# Patient Record
Sex: MALE | Race: OTHER | HISPANIC OR LATINO | URBAN - METROPOLITAN AREA
[De-identification: names, ages, dates, MRNs, and addresses within clinical notes are randomized per-mention and may not be internally consistent; named-entity substitution may affect disease eponyms.]

---

## 2019-01-24 ENCOUNTER — INPATIENT (INPATIENT)
Facility: HOSPITAL | Age: 29
LOS: 6 days | Discharge: HOME | DRG: 959 | End: 2019-01-31
Attending: SURGERY | Admitting: SURGERY
Payer: COMMERCIAL

## 2019-01-24 VITALS
HEIGHT: 73.23 IN | SYSTOLIC BLOOD PRESSURE: 116 MMHG | TEMPERATURE: 98 F | WEIGHT: 204.59 LBS | HEART RATE: 94 BPM | OXYGEN SATURATION: 99 % | RESPIRATION RATE: 11 BRPM | DIASTOLIC BLOOD PRESSURE: 54 MMHG

## 2019-01-24 DIAGNOSIS — S32.9XXA FRACTURE OF UNSPECIFIED PARTS OF LUMBOSACRAL SPINE AND PELVIS, INITIAL ENCOUNTER FOR CLOSED FRACTURE: ICD-10-CM

## 2019-01-24 LAB
ABO RH CONFIRMATION: SIGNIFICANT CHANGE UP
ALBUMIN SERPL ELPH-MCNC: 4.2 G/DL — SIGNIFICANT CHANGE UP (ref 3.3–5.2)
ALP SERPL-CCNC: 76 U/L — SIGNIFICANT CHANGE UP (ref 40–120)
ALT FLD-CCNC: 33 U/L — SIGNIFICANT CHANGE UP
ANION GAP SERPL CALC-SCNC: 10 MMOL/L — SIGNIFICANT CHANGE UP (ref 5–17)
ANION GAP SERPL CALC-SCNC: 13 MMOL/L — SIGNIFICANT CHANGE UP (ref 5–17)
APTT BLD: 24.4 SEC — LOW (ref 27.5–36.3)
APTT BLD: 30 SEC — SIGNIFICANT CHANGE UP (ref 27.5–36.3)
AST SERPL-CCNC: 32 U/L — SIGNIFICANT CHANGE UP
BASOPHILS # BLD AUTO: 0 K/UL — SIGNIFICANT CHANGE UP (ref 0–0.2)
BASOPHILS NFR BLD AUTO: 0.3 % — SIGNIFICANT CHANGE UP (ref 0–2)
BILIRUB SERPL-MCNC: 0.4 MG/DL — SIGNIFICANT CHANGE UP (ref 0.4–2)
BLD GP AB SCN SERPL QL: SIGNIFICANT CHANGE UP
BUN SERPL-MCNC: 14 MG/DL — SIGNIFICANT CHANGE UP (ref 8–20)
BUN SERPL-MCNC: 15 MG/DL — SIGNIFICANT CHANGE UP (ref 8–20)
CALCIUM SERPL-MCNC: 8.1 MG/DL — LOW (ref 8.6–10.2)
CALCIUM SERPL-MCNC: 8.8 MG/DL — SIGNIFICANT CHANGE UP (ref 8.6–10.2)
CHLORIDE SERPL-SCNC: 103 MMOL/L — SIGNIFICANT CHANGE UP (ref 98–107)
CHLORIDE SERPL-SCNC: 104 MMOL/L — SIGNIFICANT CHANGE UP (ref 98–107)
CO2 SERPL-SCNC: 25 MMOL/L — SIGNIFICANT CHANGE UP (ref 22–29)
CO2 SERPL-SCNC: 25 MMOL/L — SIGNIFICANT CHANGE UP (ref 22–29)
CREAT SERPL-MCNC: 0.89 MG/DL — SIGNIFICANT CHANGE UP (ref 0.5–1.3)
CREAT SERPL-MCNC: 0.92 MG/DL — SIGNIFICANT CHANGE UP (ref 0.5–1.3)
EOSINOPHIL # BLD AUTO: 0.1 K/UL — SIGNIFICANT CHANGE UP (ref 0–0.5)
EOSINOPHIL NFR BLD AUTO: 1.5 % — SIGNIFICANT CHANGE UP (ref 0–5)
ETHANOL SERPL-MCNC: <10 MG/DL — SIGNIFICANT CHANGE UP
GLUCOSE SERPL-MCNC: 115 MG/DL — SIGNIFICANT CHANGE UP (ref 70–115)
GLUCOSE SERPL-MCNC: 142 MG/DL — HIGH (ref 70–115)
HCT VFR BLD CALC: 33 % — LOW (ref 42–52)
HCT VFR BLD CALC: 40.7 % — LOW (ref 42–52)
HGB BLD-MCNC: 11.2 G/DL — LOW (ref 14–18)
HGB BLD-MCNC: 13.8 G/DL — LOW (ref 14–18)
INR BLD: 1.07 RATIO — SIGNIFICANT CHANGE UP (ref 0.88–1.16)
INR BLD: 1.17 RATIO — HIGH (ref 0.88–1.16)
LACTATE BLDV-MCNC: 2 MMOL/L — SIGNIFICANT CHANGE UP (ref 0.5–2)
LACTATE SERPL-SCNC: 1.6 MMOL/L — SIGNIFICANT CHANGE UP (ref 0.5–2)
LYMPHOCYTES # BLD AUTO: 3.7 K/UL — SIGNIFICANT CHANGE UP (ref 1–4.8)
LYMPHOCYTES # BLD AUTO: 42.8 % — SIGNIFICANT CHANGE UP (ref 20–55)
MAGNESIUM SERPL-MCNC: 1.7 MG/DL — SIGNIFICANT CHANGE UP (ref 1.6–2.6)
MCHC RBC-ENTMCNC: 28.9 PG — SIGNIFICANT CHANGE UP (ref 27–31)
MCHC RBC-ENTMCNC: 29.1 PG — SIGNIFICANT CHANGE UP (ref 27–31)
MCHC RBC-ENTMCNC: 33.9 G/DL — SIGNIFICANT CHANGE UP (ref 32–36)
MCHC RBC-ENTMCNC: 33.9 G/DL — SIGNIFICANT CHANGE UP (ref 32–36)
MCV RBC AUTO: 85.3 FL — SIGNIFICANT CHANGE UP (ref 80–94)
MCV RBC AUTO: 85.7 FL — SIGNIFICANT CHANGE UP (ref 80–94)
MONOCYTES # BLD AUTO: 0.3 K/UL — SIGNIFICANT CHANGE UP (ref 0–0.8)
MONOCYTES NFR BLD AUTO: 4 % — SIGNIFICANT CHANGE UP (ref 3–10)
NEUTROPHILS # BLD AUTO: 4.3 K/UL — SIGNIFICANT CHANGE UP (ref 1.8–8)
NEUTROPHILS NFR BLD AUTO: 49.8 % — SIGNIFICANT CHANGE UP (ref 37–73)
PLATELET # BLD AUTO: 147 K/UL — LOW (ref 150–400)
PLATELET # BLD AUTO: 172 K/UL — SIGNIFICANT CHANGE UP (ref 150–400)
POTASSIUM SERPL-MCNC: 3.6 MMOL/L — SIGNIFICANT CHANGE UP (ref 3.5–5.3)
POTASSIUM SERPL-MCNC: 4.2 MMOL/L — SIGNIFICANT CHANGE UP (ref 3.5–5.3)
POTASSIUM SERPL-SCNC: 3.6 MMOL/L — SIGNIFICANT CHANGE UP (ref 3.5–5.3)
POTASSIUM SERPL-SCNC: 4.2 MMOL/L — SIGNIFICANT CHANGE UP (ref 3.5–5.3)
PROT SERPL-MCNC: 7.3 G/DL — SIGNIFICANT CHANGE UP (ref 6.6–8.7)
PROTHROM AB SERPL-ACNC: 12.3 SEC — SIGNIFICANT CHANGE UP (ref 10–12.9)
PROTHROM AB SERPL-ACNC: 13.5 SEC — HIGH (ref 10–12.9)
RBC # BLD: 3.87 M/UL — LOW (ref 4.6–6.2)
RBC # BLD: 4.75 M/UL — SIGNIFICANT CHANGE UP (ref 4.6–6.2)
RBC # FLD: 13.5 % — SIGNIFICANT CHANGE UP (ref 11–15.6)
RBC # FLD: 13.8 % — SIGNIFICANT CHANGE UP (ref 11–15.6)
SODIUM SERPL-SCNC: 139 MMOL/L — SIGNIFICANT CHANGE UP (ref 135–145)
SODIUM SERPL-SCNC: 141 MMOL/L — SIGNIFICANT CHANGE UP (ref 135–145)
TYPE + AB SCN PNL BLD: SIGNIFICANT CHANGE UP
WBC # BLD: 11.7 K/UL — HIGH (ref 4.8–10.8)
WBC # BLD: 8.6 K/UL — SIGNIFICANT CHANGE UP (ref 4.8–10.8)
WBC # FLD AUTO: 11.7 K/UL — HIGH (ref 4.8–10.8)
WBC # FLD AUTO: 8.6 K/UL — SIGNIFICANT CHANGE UP (ref 4.8–10.8)

## 2019-01-24 PROCEDURE — 71260 CT THORAX DX C+: CPT | Mod: 26

## 2019-01-24 PROCEDURE — 72125 CT NECK SPINE W/O DYE: CPT | Mod: 26

## 2019-01-24 PROCEDURE — 27197 CLSD TX PELVIC RING FX: CPT | Mod: RT

## 2019-01-24 PROCEDURE — 74177 CT ABD & PELVIS W/CONTRAST: CPT | Mod: 26

## 2019-01-24 PROCEDURE — 74018 RADEX ABDOMEN 1 VIEW: CPT | Mod: 26

## 2019-01-24 PROCEDURE — 71045 X-RAY EXAM CHEST 1 VIEW: CPT | Mod: 26

## 2019-01-24 PROCEDURE — 99291 CRITICAL CARE FIRST HOUR: CPT

## 2019-01-24 PROCEDURE — 99223 1ST HOSP IP/OBS HIGH 75: CPT | Mod: 25,57

## 2019-01-24 PROCEDURE — 70450 CT HEAD/BRAIN W/O DYE: CPT | Mod: 26

## 2019-01-24 PROCEDURE — 72131 CT LUMBAR SPINE W/O DYE: CPT | Mod: 26

## 2019-01-24 RX ORDER — KETOROLAC TROMETHAMINE 30 MG/ML
30 SYRINGE (ML) INJECTION ONCE
Qty: 0 | Refills: 0 | Status: DISCONTINUED | OUTPATIENT
Start: 2019-01-24 | End: 2019-01-24

## 2019-01-24 RX ORDER — SODIUM CHLORIDE 9 MG/ML
1000 INJECTION, SOLUTION INTRAVENOUS
Qty: 0 | Refills: 0 | Status: DISCONTINUED | OUTPATIENT
Start: 2019-01-24 | End: 2019-01-25

## 2019-01-24 RX ORDER — HYDROMORPHONE HYDROCHLORIDE 2 MG/ML
1 INJECTION INTRAMUSCULAR; INTRAVENOUS; SUBCUTANEOUS EVERY 4 HOURS
Qty: 0 | Refills: 0 | Status: DISCONTINUED | OUTPATIENT
Start: 2019-01-24 | End: 2019-01-29

## 2019-01-24 RX ORDER — ACETAMINOPHEN 500 MG
1000 TABLET ORAL ONCE
Qty: 0 | Refills: 0 | Status: COMPLETED | OUTPATIENT
Start: 2019-01-24 | End: 2019-01-25

## 2019-01-24 RX ORDER — HYDROMORPHONE HYDROCHLORIDE 2 MG/ML
2 INJECTION INTRAMUSCULAR; INTRAVENOUS; SUBCUTANEOUS EVERY 4 HOURS
Qty: 0 | Refills: 0 | Status: DISCONTINUED | OUTPATIENT
Start: 2019-01-24 | End: 2019-01-24

## 2019-01-24 RX ORDER — CHLORHEXIDINE GLUCONATE 213 G/1000ML
1 SOLUTION TOPICAL DAILY
Qty: 0 | Refills: 0 | Status: DISCONTINUED | OUTPATIENT
Start: 2019-01-24 | End: 2019-01-29

## 2019-01-24 RX ORDER — MAGNESIUM SULFATE 500 MG/ML
2 VIAL (ML) INJECTION ONCE
Qty: 0 | Refills: 0 | Status: COMPLETED | OUTPATIENT
Start: 2019-01-24 | End: 2019-01-25

## 2019-01-24 RX ORDER — FENTANYL CITRATE 50 UG/ML
50 INJECTION INTRAVENOUS ONCE
Qty: 0 | Refills: 0 | Status: DISCONTINUED | OUTPATIENT
Start: 2019-01-24 | End: 2019-01-24

## 2019-01-24 RX ORDER — HYDROMORPHONE HYDROCHLORIDE 2 MG/ML
0.5 INJECTION INTRAMUSCULAR; INTRAVENOUS; SUBCUTANEOUS EVERY 4 HOURS
Qty: 0 | Refills: 0 | Status: DISCONTINUED | OUTPATIENT
Start: 2019-01-24 | End: 2019-01-25

## 2019-01-24 RX ADMIN — HYDROMORPHONE HYDROCHLORIDE 2 MILLIGRAM(S): 2 INJECTION INTRAMUSCULAR; INTRAVENOUS; SUBCUTANEOUS at 15:55

## 2019-01-24 RX ADMIN — SODIUM CHLORIDE 125 MILLILITER(S): 9 INJECTION, SOLUTION INTRAVENOUS at 13:55

## 2019-01-24 RX ADMIN — Medication 30 MILLIGRAM(S): at 12:32

## 2019-01-24 RX ADMIN — HYDROMORPHONE HYDROCHLORIDE 0.5 MILLIGRAM(S): 2 INJECTION INTRAMUSCULAR; INTRAVENOUS; SUBCUTANEOUS at 22:30

## 2019-01-24 RX ADMIN — FENTANYL CITRATE 50 MICROGRAM(S): 50 INJECTION INTRAVENOUS at 12:24

## 2019-01-24 RX ADMIN — FENTANYL CITRATE 50 MICROGRAM(S): 50 INJECTION INTRAVENOUS at 12:40

## 2019-01-24 RX ADMIN — FENTANYL CITRATE 50 MICROGRAM(S): 50 INJECTION INTRAVENOUS at 12:31

## 2019-01-24 RX ADMIN — FENTANYL CITRATE 50 MICROGRAM(S): 50 INJECTION INTRAVENOUS at 12:32

## 2019-01-24 RX ADMIN — Medication 30 MILLIGRAM(S): at 12:40

## 2019-01-24 RX ADMIN — HYDROMORPHONE HYDROCHLORIDE 2 MILLIGRAM(S): 2 INJECTION INTRAMUSCULAR; INTRAVENOUS; SUBCUTANEOUS at 15:46

## 2019-01-24 RX ADMIN — HYDROMORPHONE HYDROCHLORIDE 0.5 MILLIGRAM(S): 2 INJECTION INTRAMUSCULAR; INTRAVENOUS; SUBCUTANEOUS at 22:08

## 2019-01-24 NOTE — BRIEF OPERATIVE NOTE - PROCEDURE
<<-----Click on this checkbox to enter Procedure Angiogram, pelvic vessels  01/24/2019    Active  DIMITRI

## 2019-01-24 NOTE — ED CLERICAL - NS ED CLERK UNITS
[facility]

## 2019-01-24 NOTE — ED ADULT NURSE NOTE - OBJECTIVE STATEMENT
Patient BIBA to ED after multiple fiberglass boards fell on him at work today.  Patient came into ED as a trauma B.  Patient has no LOC.

## 2019-01-24 NOTE — CONSULT NOTE ADULT - ATTENDING COMMENTS
Ortho Trauma Attending:  Agree with above PA note.  Note edited where necessary.  Fracture pattern is likely stable from an orthopedic perspective. Will defer to ACS for management of the vascular concerns. Please contact ortho with any questions or concerns.    Isai Morrow MD  Orthopaedic Trauma Surgery

## 2019-01-24 NOTE — H&P ADULT - NSHPLABSRESULTS_GEN_ALL_CORE
CBC Full  -  ( 24 Jan 2019 12:30 )  WBC Count : 8.6 K/uL  Hemoglobin : 13.8 g/dL  Hematocrit : 40.7 %  Platelet Count - Automated : 172 K/uL  Mean Cell Volume : 85.7 fl  Mean Cell Hemoglobin : 29.1 pg  Mean Cell Hemoglobin Concentration : 33.9 g/dL  Auto Neutrophil # : 4.3 K/uL  Auto Lymphocyte # : 3.7 K/uL  Auto Monocyte # : 0.3 K/uL  Auto Eosinophil # : 0.1 K/uL  Auto Basophil # : 0.0 K/uL  Auto Neutrophil % : 49.8 %  Auto Lymphocyte % : 42.8 %  Auto Monocyte % : 4.0 %  Auto Eosinophil % : 1.5 %  Auto Basophil % : 0.3 %    01-24    141  |  103  |  14.0  ----------------------------<  142<H>  3.6   |  25.0  |  0.92    Ca    8.8      24 Jan 2019 12:30    TPro  7.3  /  Alb  4.2  /  TBili  0.4  /  DBili  x   /  AST  32  /  ALT  33  /  AlkPhos  76  01-24    PT/INR - ( 24 Jan 2019 12:30 )   PT: 12.3 sec;   INR: 1.07 ratio         PTT - ( 24 Jan 2019 12:30 )  PTT:30.0 sec    lactate 2.0    < from: CT Cervical Spine No Cont (01.24.19 @ 12:58) >       EXAM:  CT CERVICAL SPINE                          PROCEDURE DATE:  01/24/2019          INTERPRETATION:  HISTORY: Neck injury following trauma. Pain. Assess for   fracture.    Technique: 1.25 mm axial sections with sagittal and coronal   reconstructions, were obtained from T2 to the base of the skull without   contrast. 3-D imaging was created and interpreted..    Comparisons: None        FINDINGS:  There is straightening of the cervical curve. Facet popliteal and we are   the pulmonary arterial anatomy is elevated the lip and remainder of  There is no fracture, vertebral subluxation or pre-vertebral soft tissue   swelling. The cervicomedullary junction is within normal limits.    The visualized bones, joints and soft tissues are within normal limits.    There is no significant spinal canal or neural foraminal narrowing.    The facet and pedicle alignments are unremarkable.    IMPRESSION:   No fracture, dislocation or prevertebral soft tissue swelling of the   cervical spine.                KRIS METCALF M.D., ATTENDING RADIOLOGIST  This document has been electronically signed. Jan 24 2019  1:08PM                  < end of copied text > CBC Full  -  ( 24 Jan 2019 12:30 )  WBC Count : 8.6 K/uL  Hemoglobin : 13.8 g/dL  Hematocrit : 40.7 %  Platelet Count - Automated : 172 K/uL  Mean Cell Volume : 85.7 fl  Mean Cell Hemoglobin : 29.1 pg  Mean Cell Hemoglobin Concentration : 33.9 g/dL  Auto Neutrophil # : 4.3 K/uL  Auto Lymphocyte # : 3.7 K/uL  Auto Monocyte # : 0.3 K/uL  Auto Eosinophil # : 0.1 K/uL  Auto Basophil # : 0.0 K/uL  Auto Neutrophil % : 49.8 %  Auto Lymphocyte % : 42.8 %  Auto Monocyte % : 4.0 %  Auto Eosinophil % : 1.5 %  Auto Basophil % : 0.3 %    01-24  141  |  103  |  14.0  ----------------------------<  142<H>  3.6   |  25.0  |  0.92    Ca    8.8      24 Jan 2019 12:30    TPro  7.3  /  Alb  4.2  /  TBili  0.4  /  DBili  x   /  AST  32  /  ALT  33  /  AlkPhos  76  01-24    PT/INR - ( 24 Jan 2019 12:30 )   PT: 12.3 sec;   INR: 1.07 ratio      PTT - ( 24 Jan 2019 12:30 )  PTT:30.0 sec    lactate 2.0    CT head no cont: Impression = No fracture. No acute density changes in the brain. 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    CT chest abd & pelvis, IV contrast: Impression = ACTIVE ARTERIAL CONTRAST EXTRAVASATION INTO THE LEFT SPACE OF RETZIUS. DISPLACING BLADDER AND PROSTATE. FRACTURE LEFT INFERIOR PUBIC RAMUS AND THE RIGHT SACRUM AS DESCRIBED. Bone spicules within the RIGHT S1-S2 foramen. Presacral hematoma without active extravasation . Remaining chest, abdomen and pelvic visceral anatomy unremarkable.    CT lumbar spine: Impression = acute comminuted displaced fracture of the right hemisacrum and sacral ala involving the articulating facets, extending into the sacral foraminal and right sacroiliac joint.

## 2019-01-24 NOTE — ED PROVIDER NOTE - CONSTITUTIONAL, MLM
normal... Well appearing, well nourished, awake, alert, oriented to person, place, time/situation + back pain

## 2019-01-24 NOTE — ED PROVIDER NOTE - OBJECTIVE STATEMENT
Pt presents as trauma, 2000lb of glass fell on his lower body, now complains of lower back pain. no head injury, no LOC, no airway compromise, no neck pain, moving all extremities.  trauma team at bedside

## 2019-01-24 NOTE — BRIEF OPERATIVE NOTE - OPERATION/FINDINGS
Pelvic angiogram showed no active extravasation from the left internal or external iliac arteries to account for the left pelvic hematoma and extravasation seen on the prior CT.  Contrast extravasation seen from a right pudendal artery branch successfully embolized with Gelfoam.  Another area of extravasation which appeared to originate from a vesicular branch could not be identified on subselective arteriography.  Findings were discussed with Dr. Maldonado at the time of the procedure. As the patient remained hemodynamically stable it was elected not to perform empiric Gelfoam embolization of either internal iliac artery.

## 2019-01-24 NOTE — ED ADULT NURSE NOTE - NSIMPLEMENTINTERV_GEN_ALL_ED
Implemented All Universal Safety Interventions:  [place] to call system. Call bell, personal items and telephone within reach. Instruct patient to call for assistance. Room bathroom lighting operational. Non-slip footwear when patient is off stretcher. Physically safe environment: no spills, clutter or unnecessary equipment. Stretcher in lowest position, wheels locked, appropriate side rails in place.

## 2019-01-24 NOTE — H&P ADULT - ATTENDING COMMENTS
H / P as documented. Hemodynamically normal on presentation. Secondary survey demonstrated low back pain but NO neurologic deficits. He had no cervical spine pain but did seem distracted by his back pain. Imaging demonstrated a significant sacral fracture through sacral foramina, an inferior pubic ramus fracture with right sided pelvic hematoma and blush. There were calcifications on head CT but no bleed; reviewed with neuroradiology. No gross hematuria noted with escoto. There was a blush on venous phase of pelvis in the space of retzius and we agreed to NO pelvic angio unless there was any clinical change. About 90 minutes after admission he dropped BP to 75. He responded to 1 l LR but we requested 2 units emergency release blood, placed a central line and have arranged pelvic angio. Patient looks clinically well at this time. ICU admission and orthopedic consults.

## 2019-01-24 NOTE — ED ADULT TRIAGE NOTE - CHIEF COMPLAINT QUOTE
Pt BIBA from work for CODE TRAUMA requested by EMS. Pt at work when 2,000 lbs of fiber-glass fell on the patient, negative LOC or blood thinners. Refer to trauma flowsheeet.

## 2019-01-24 NOTE — H&P ADULT - HISTORY OF PRESENT ILLNESS
42 y/o male BIBEMS after large, heavy fiber-glass plate fell on him at work. Patient was pinned underneath plate and had to be extricated by co-workers. Denies LOC. Was wearing hard hat at time of the accident. He presents to trauma bay complaining of low back pain. Airway: intact, cervical collar in place on arrival. Breathing: breath sounds CTA b/l, no accessory muscle use, no conversational dyspnea. Circulation: 2+ central and peripheral pulses b/l. Disability: GCS15, pupils 3mm round and reactive to light b/l. Exposure: fully exposed in trauma bay and covered with warm blankets. B/l upper and lower extremities with motor and sensation grossly intact. CXR and abd/pelvis XR performed in trauma bay - question of lumbar vertebrae fx visualized in XR. Rcvd 50mcg fentanyl x2 and 30mg toradol in trauma bay. Transported to CT scan on monitor. 42 y/o male BIBEMS after large, heavy fiber-glass plate fell on him at work. Patient was pinned underneath plate and had to be extricated by co-workers. Denies LOC. Was wearing hard hat at time of the accident. He presents to trauma bay complaining of low back pain. Denies SOB, CP, palpitations, nausea, vomiting, lightheadedness or dizziness. Airway: intact, cervical collar in place on arrival. Breathing: breath sounds CTA b/l, no accessory muscle use, no conversational dyspnea. Circulation: 2+ central and peripheral pulses b/l. Disability: GCS15, pupils 3mm round and reactive to light b/l. Exposure: fully exposed in trauma bay and covered with warm blankets. B/l upper and lower extremities with motor and sensation grossly intact. CXR and abd/pelvis XR performed in trauma bay - question of lumbar vertebrae fx visualized in XR. Rcvd 50mcg fentanyl x2 and 30mg toradol in trauma bay. Transported to CT scan on monitor.

## 2019-01-24 NOTE — H&P ADULT - NSHPPHYSICALEXAM_GEN_ALL_CORE
Constitutional: well developed well nourished appearing male presenting in distress secondary to pain  HEENT: Head is normocephalic and atraumatic, maxillofacial structures stable, no blood or discharge from nares or oral cavity, no lucas sign / racoon eyes, EOMI b/l, pupils 3mm round and reactive to light b/l, no active drainage or redness  Neck: cervical collar in place, trachea midline  Respiratory: breath sounds CTA b/l respirations are unlabored, no accessory muscle use, no conversational dyspnea  Cardiovascular: Regular rate & rhythm, +S1, S2  Chest: chest wall is non-tender to palpation, no subQ emphysema or crepitus palpated  Gastrointestinal: abdomen soft, non-tender, non-distended, no rebound tenderness / guarding, no ecchymosis or external signs of abdominal trauma  Extremities: moving all extremities spontaneously, no point tenderness or deformity noted to upper or lower extremities b/l, superficial abrasion to left elbow  Pelvis: stable  Vascular: 2+ radial, femoral, and DP pulses b/l  Neurological: GCS: 15 (4/5/6). A&O x 3; no gross sensory / motor / coordination deficits  Musculoskeletal: 5/5 strength of upper and lower extremities b/l  Back: + lumbar spine tenderness, no step-offs or signs of external trauma to the back Constitutional: well developed well nourished appearing male presenting in distress secondary to pain  HEENT: Head is normocephalic and atraumatic, maxillofacial structures stable, no blood or discharge from nares or oral cavity, no lucas sign / racoon eyes, EOMI b/l, pupils 3mm round and reactive to light b/l, no active drainage or redness  Neck: cervical collar in place, trachea midline  Respiratory: breath sounds CTA b/l respirations are unlabored, no accessory muscle use, no conversational dyspnea  Cardiovascular: Regular rate & rhythm, +S1, S2  Chest: chest wall is non-tender to palpation, no subQ emphysema or crepitus palpated  Gastrointestinal: abdomen soft, non-tender, non-distended, no rebound tenderness / guarding, no ecchymosis or external signs of abdominal trauma  Extremities: moving all extremities spontaneously, no point tenderness or deformity noted to upper or lower extremities b/l, superficial abrasion to left elbow  Pelvis: stable  Vascular: 2+ radial, femoral, and DP pulses b/l  Neurological: GCS: 15 (4/5/6). A&O x 3; no gross sensory / motor / coordination deficits  Musculoskeletal: 5/5 strength of upper extremities and RLE. Able to move LLE spontaneously however ROM is limited secondary to pain  Back: + lumbar spine tenderness, no step-offs or signs of external trauma to the back

## 2019-01-24 NOTE — ED PROVIDER NOTE - CRITICAL CARE PROVIDED
consultation with other physicians/documentation/direct patient care (not related to procedure)/interpretation of diagnostic studies/additional history taking

## 2019-01-24 NOTE — H&P ADULT - ASSESSMENT
46 y/o male BIBEMS after large heavy fiber-glass plate fell on him at work. CT scan showed left inferior pubic rami fx, right sacral fx, with active arterial contrast extrav into left space of retzius, presacral hematoma without active extravasation.   - Admit to SICU under Dr. Maldonado  - Orthopedic consult called - will f/u their recommendations  - Awaiting results of L spine reformat  - Strict I&Os - escoto to be placed due to proximity of bladder to fractures  - Pain control PRN  - DVT ppx with b/l SCDs  - Pulmonary toilet

## 2019-01-25 ENCOUNTER — TRANSCRIPTION ENCOUNTER (OUTPATIENT)
Age: 29
End: 2019-01-25

## 2019-01-25 DIAGNOSIS — T14.8XXA OTHER INJURY OF UNSPECIFIED BODY REGION, INITIAL ENCOUNTER: ICD-10-CM

## 2019-01-25 DIAGNOSIS — S32.9XXA FRACTURE OF UNSPECIFIED PARTS OF LUMBOSACRAL SPINE AND PELVIS, INITIAL ENCOUNTER FOR CLOSED FRACTURE: ICD-10-CM

## 2019-01-25 LAB
AMPHET UR-MCNC: NEGATIVE — SIGNIFICANT CHANGE UP
ANION GAP SERPL CALC-SCNC: 10 MMOL/L — SIGNIFICANT CHANGE UP (ref 5–17)
ANION GAP SERPL CALC-SCNC: 9 MMOL/L — SIGNIFICANT CHANGE UP (ref 5–17)
APPEARANCE UR: CLEAR — SIGNIFICANT CHANGE UP
BARBITURATES UR SCN-MCNC: NEGATIVE — SIGNIFICANT CHANGE UP
BASOPHILS # BLD AUTO: 0 K/UL — SIGNIFICANT CHANGE UP (ref 0–0.2)
BASOPHILS NFR BLD AUTO: 0.3 % — SIGNIFICANT CHANGE UP (ref 0–2)
BENZODIAZ UR-MCNC: NEGATIVE — SIGNIFICANT CHANGE UP
BILIRUB UR-MCNC: NEGATIVE — SIGNIFICANT CHANGE UP
BUN SERPL-MCNC: 10 MG/DL — SIGNIFICANT CHANGE UP (ref 8–20)
BUN SERPL-MCNC: 16 MG/DL — SIGNIFICANT CHANGE UP (ref 8–20)
CALCIUM SERPL-MCNC: 8.1 MG/DL — LOW (ref 8.6–10.2)
CALCIUM SERPL-MCNC: 8.3 MG/DL — LOW (ref 8.6–10.2)
CHLORIDE SERPL-SCNC: 102 MMOL/L — SIGNIFICANT CHANGE UP (ref 98–107)
CHLORIDE SERPL-SCNC: 108 MMOL/L — HIGH (ref 98–107)
CO2 SERPL-SCNC: 24 MMOL/L — SIGNIFICANT CHANGE UP (ref 22–29)
CO2 SERPL-SCNC: 26 MMOL/L — SIGNIFICANT CHANGE UP (ref 22–29)
COCAINE METAB.OTHER UR-MCNC: NEGATIVE — SIGNIFICANT CHANGE UP
COLOR SPEC: YELLOW — SIGNIFICANT CHANGE UP
CREAT SERPL-MCNC: 0.72 MG/DL — SIGNIFICANT CHANGE UP (ref 0.5–1.3)
CREAT SERPL-MCNC: 0.91 MG/DL — SIGNIFICANT CHANGE UP (ref 0.5–1.3)
DIFF PNL FLD: ABNORMAL
EOSINOPHIL # BLD AUTO: 0.1 K/UL — SIGNIFICANT CHANGE UP (ref 0–0.5)
EOSINOPHIL NFR BLD AUTO: 1.3 % — SIGNIFICANT CHANGE UP (ref 0–5)
EPI CELLS # UR: SIGNIFICANT CHANGE UP
GLUCOSE BLDC GLUCOMTR-MCNC: 105 MG/DL — HIGH (ref 70–99)
GLUCOSE SERPL-MCNC: 123 MG/DL — HIGH (ref 70–115)
GLUCOSE SERPL-MCNC: 128 MG/DL — HIGH (ref 70–115)
GLUCOSE UR QL: NEGATIVE MG/DL — SIGNIFICANT CHANGE UP
HCT VFR BLD CALC: 28.8 % — LOW (ref 42–52)
HCT VFR BLD CALC: 31.3 % — LOW (ref 42–52)
HGB BLD-MCNC: 10.5 G/DL — LOW (ref 14–18)
HGB BLD-MCNC: 9.5 G/DL — LOW (ref 14–18)
INR BLD: 1.21 RATIO — HIGH (ref 0.88–1.16)
KETONES UR-MCNC: NEGATIVE — SIGNIFICANT CHANGE UP
LACTATE BLDV-MCNC: 1 MMOL/L — SIGNIFICANT CHANGE UP (ref 0.5–2)
LEUKOCYTE ESTERASE UR-ACNC: ABNORMAL
LYMPHOCYTES # BLD AUTO: 1.6 K/UL — SIGNIFICANT CHANGE UP (ref 1–4.8)
LYMPHOCYTES # BLD AUTO: 17.7 % — LOW (ref 20–55)
MAGNESIUM SERPL-MCNC: 1.8 MG/DL — SIGNIFICANT CHANGE UP (ref 1.6–2.6)
MAGNESIUM SERPL-MCNC: 2.3 MG/DL — SIGNIFICANT CHANGE UP (ref 1.6–2.6)
MCHC RBC-ENTMCNC: 28.8 PG — SIGNIFICANT CHANGE UP (ref 27–31)
MCHC RBC-ENTMCNC: 29.2 PG — SIGNIFICANT CHANGE UP (ref 27–31)
MCHC RBC-ENTMCNC: 33 G/DL — SIGNIFICANT CHANGE UP (ref 32–36)
MCHC RBC-ENTMCNC: 33.5 G/DL — SIGNIFICANT CHANGE UP (ref 32–36)
MCV RBC AUTO: 87.2 FL — SIGNIFICANT CHANGE UP (ref 80–94)
MCV RBC AUTO: 87.3 FL — SIGNIFICANT CHANGE UP (ref 80–94)
METHADONE UR-MCNC: NEGATIVE — SIGNIFICANT CHANGE UP
MONOCYTES # BLD AUTO: 1 K/UL — HIGH (ref 0–0.8)
MONOCYTES NFR BLD AUTO: 10.3 % — HIGH (ref 3–10)
NEUTROPHILS # BLD AUTO: 6.5 K/UL — SIGNIFICANT CHANGE UP (ref 1.8–8)
NEUTROPHILS NFR BLD AUTO: 70.2 % — SIGNIFICANT CHANGE UP (ref 37–73)
NITRITE UR-MCNC: NEGATIVE — SIGNIFICANT CHANGE UP
OPIATES UR-MCNC: POSITIVE
PCP SPEC-MCNC: SIGNIFICANT CHANGE UP
PCP UR-MCNC: NEGATIVE — SIGNIFICANT CHANGE UP
PH UR: 6 — SIGNIFICANT CHANGE UP (ref 5–8)
PHOSPHATE SERPL-MCNC: 2.5 MG/DL — SIGNIFICANT CHANGE UP (ref 2.4–4.7)
PHOSPHATE SERPL-MCNC: 2.9 MG/DL — SIGNIFICANT CHANGE UP (ref 2.4–4.7)
PLATELET # BLD AUTO: 115 K/UL — LOW (ref 150–400)
PLATELET # BLD AUTO: 137 K/UL — LOW (ref 150–400)
POTASSIUM SERPL-MCNC: 3.7 MMOL/L — SIGNIFICANT CHANGE UP (ref 3.5–5.3)
POTASSIUM SERPL-MCNC: 4.2 MMOL/L — SIGNIFICANT CHANGE UP (ref 3.5–5.3)
POTASSIUM SERPL-SCNC: 3.7 MMOL/L — SIGNIFICANT CHANGE UP (ref 3.5–5.3)
POTASSIUM SERPL-SCNC: 4.2 MMOL/L — SIGNIFICANT CHANGE UP (ref 3.5–5.3)
PROT UR-MCNC: 30 MG/DL
PROTHROM AB SERPL-ACNC: 14 SEC — HIGH (ref 10–12.9)
RBC # BLD: 3.3 M/UL — LOW (ref 4.6–6.2)
RBC # BLD: 3.59 M/UL — LOW (ref 4.6–6.2)
RBC # FLD: 13.9 % — SIGNIFICANT CHANGE UP (ref 11–15.6)
RBC # FLD: 14 % — SIGNIFICANT CHANGE UP (ref 11–15.6)
RBC CASTS # UR COMP ASSIST: ABNORMAL /HPF (ref 0–4)
SODIUM SERPL-SCNC: 138 MMOL/L — SIGNIFICANT CHANGE UP (ref 135–145)
SODIUM SERPL-SCNC: 141 MMOL/L — SIGNIFICANT CHANGE UP (ref 135–145)
SP GR SPEC: 1.02 — SIGNIFICANT CHANGE UP (ref 1.01–1.02)
THC UR QL: NEGATIVE — SIGNIFICANT CHANGE UP
UROBILINOGEN FLD QL: 1 MG/DL
WBC # BLD: 7.9 K/UL — SIGNIFICANT CHANGE UP (ref 4.8–10.8)
WBC # BLD: 9.3 K/UL — SIGNIFICANT CHANGE UP (ref 4.8–10.8)
WBC # FLD AUTO: 7.9 K/UL — SIGNIFICANT CHANGE UP (ref 4.8–10.8)
WBC # FLD AUTO: 9.3 K/UL — SIGNIFICANT CHANGE UP (ref 4.8–10.8)
WBC UR QL: SIGNIFICANT CHANGE UP

## 2019-01-25 PROCEDURE — 71045 X-RAY EXAM CHEST 1 VIEW: CPT | Mod: 26

## 2019-01-25 PROCEDURE — 76937 US GUIDE VASCULAR ACCESS: CPT | Mod: 26

## 2019-01-25 PROCEDURE — 93010 ELECTROCARDIOGRAM REPORT: CPT | Mod: 77

## 2019-01-25 PROCEDURE — 93010 ELECTROCARDIOGRAM REPORT: CPT

## 2019-01-25 PROCEDURE — 93970 EXTREMITY STUDY: CPT | Mod: 26

## 2019-01-25 PROCEDURE — 37244 VASC EMBOLIZE/OCCLUDE BLEED: CPT

## 2019-01-25 PROCEDURE — 72170 X-RAY EXAM OF PELVIS: CPT | Mod: 26

## 2019-01-25 PROCEDURE — 36247 INS CATH ABD/L-EXT ART 3RD: CPT

## 2019-01-25 RX ORDER — SODIUM CHLORIDE 9 MG/ML
500 INJECTION, SOLUTION INTRAVENOUS ONCE
Qty: 0 | Refills: 0 | Status: COMPLETED | OUTPATIENT
Start: 2019-01-25 | End: 2019-01-25

## 2019-01-25 RX ORDER — HYDROMORPHONE HYDROCHLORIDE 2 MG/ML
0.5 INJECTION INTRAMUSCULAR; INTRAVENOUS; SUBCUTANEOUS
Qty: 0 | Refills: 0 | Status: DISCONTINUED | OUTPATIENT
Start: 2019-01-25 | End: 2019-01-29

## 2019-01-25 RX ORDER — IBUPROFEN 200 MG
400 TABLET ORAL EVERY 8 HOURS
Qty: 0 | Refills: 0 | Status: DISCONTINUED | OUTPATIENT
Start: 2019-01-25 | End: 2019-01-31

## 2019-01-25 RX ORDER — ENOXAPARIN SODIUM 100 MG/ML
30 INJECTION SUBCUTANEOUS EVERY 12 HOURS
Qty: 0 | Refills: 0 | Status: DISCONTINUED | OUTPATIENT
Start: 2019-01-25 | End: 2019-01-31

## 2019-01-25 RX ORDER — ONDANSETRON 8 MG/1
4 TABLET, FILM COATED ORAL ONCE
Qty: 0 | Refills: 0 | Status: COMPLETED | OUTPATIENT
Start: 2019-01-25 | End: 2019-01-25

## 2019-01-25 RX ORDER — ACETAMINOPHEN 500 MG
650 TABLET ORAL ONCE
Qty: 0 | Refills: 0 | Status: COMPLETED | OUTPATIENT
Start: 2019-01-25 | End: 2019-01-25

## 2019-01-25 RX ORDER — SODIUM CHLORIDE 9 MG/ML
500 INJECTION, SOLUTION INTRAVENOUS ONCE
Qty: 0 | Refills: 0 | Status: DISCONTINUED | OUTPATIENT
Start: 2019-01-25 | End: 2019-01-25

## 2019-01-25 RX ORDER — KETOROLAC TROMETHAMINE 30 MG/ML
15 SYRINGE (ML) INJECTION ONCE
Qty: 0 | Refills: 0 | Status: DISCONTINUED | OUTPATIENT
Start: 2019-01-25 | End: 2019-01-25

## 2019-01-25 RX ORDER — HYDROMORPHONE HYDROCHLORIDE 2 MG/ML
1 INJECTION INTRAMUSCULAR; INTRAVENOUS; SUBCUTANEOUS ONCE
Qty: 0 | Refills: 0 | Status: DISCONTINUED | OUTPATIENT
Start: 2019-01-25 | End: 2019-01-25

## 2019-01-25 RX ORDER — ACETAMINOPHEN 500 MG
1000 TABLET ORAL ONCE
Qty: 0 | Refills: 0 | Status: COMPLETED | OUTPATIENT
Start: 2019-01-25 | End: 2019-01-25

## 2019-01-25 RX ORDER — ACETAMINOPHEN 500 MG
650 TABLET ORAL EVERY 6 HOURS
Qty: 0 | Refills: 0 | Status: DISCONTINUED | OUTPATIENT
Start: 2019-01-25 | End: 2019-01-31

## 2019-01-25 RX ORDER — OXYCODONE HYDROCHLORIDE 5 MG/1
5 TABLET ORAL EVERY 4 HOURS
Qty: 0 | Refills: 0 | Status: DISCONTINUED | OUTPATIENT
Start: 2019-01-25 | End: 2019-01-29

## 2019-01-25 RX ORDER — LIDOCAINE 4 G/100G
1 CREAM TOPICAL DAILY
Qty: 0 | Refills: 0 | Status: DISCONTINUED | OUTPATIENT
Start: 2019-01-25 | End: 2019-01-31

## 2019-01-25 RX ORDER — SODIUM CHLORIDE 9 MG/ML
300 INJECTION, SOLUTION INTRAVENOUS ONCE
Qty: 0 | Refills: 0 | Status: COMPLETED | OUTPATIENT
Start: 2019-01-25 | End: 2019-01-25

## 2019-01-25 RX ORDER — GABAPENTIN 400 MG/1
300 CAPSULE ORAL EVERY 8 HOURS
Qty: 0 | Refills: 0 | Status: DISCONTINUED | OUTPATIENT
Start: 2019-01-25 | End: 2019-01-31

## 2019-01-25 RX ADMIN — HYDROMORPHONE HYDROCHLORIDE 0.5 MILLIGRAM(S): 2 INJECTION INTRAMUSCULAR; INTRAVENOUS; SUBCUTANEOUS at 22:22

## 2019-01-25 RX ADMIN — HYDROMORPHONE HYDROCHLORIDE 1 MILLIGRAM(S): 2 INJECTION INTRAMUSCULAR; INTRAVENOUS; SUBCUTANEOUS at 10:05

## 2019-01-25 RX ADMIN — HYDROMORPHONE HYDROCHLORIDE 1 MILLIGRAM(S): 2 INJECTION INTRAMUSCULAR; INTRAVENOUS; SUBCUTANEOUS at 19:29

## 2019-01-25 RX ADMIN — Medication 15 MILLIGRAM(S): at 12:00

## 2019-01-25 RX ADMIN — OXYCODONE HYDROCHLORIDE 5 MILLIGRAM(S): 5 TABLET ORAL at 14:25

## 2019-01-25 RX ADMIN — LIDOCAINE 1 PATCH: 4 CREAM TOPICAL at 23:12

## 2019-01-25 RX ADMIN — HYDROMORPHONE HYDROCHLORIDE 1 MILLIGRAM(S): 2 INJECTION INTRAMUSCULAR; INTRAVENOUS; SUBCUTANEOUS at 14:05

## 2019-01-25 RX ADMIN — Medication 650 MILLIGRAM(S): at 17:56

## 2019-01-25 RX ADMIN — LIDOCAINE 1 PATCH: 4 CREAM TOPICAL at 11:47

## 2019-01-25 RX ADMIN — LIDOCAINE 1 PATCH: 4 CREAM TOPICAL at 22:00

## 2019-01-25 RX ADMIN — SODIUM CHLORIDE 1200 MILLILITER(S): 9 INJECTION, SOLUTION INTRAVENOUS at 21:56

## 2019-01-25 RX ADMIN — GABAPENTIN 300 MILLIGRAM(S): 400 CAPSULE ORAL at 21:54

## 2019-01-25 RX ADMIN — HYDROMORPHONE HYDROCHLORIDE 0.5 MILLIGRAM(S): 2 INJECTION INTRAMUSCULAR; INTRAVENOUS; SUBCUTANEOUS at 04:23

## 2019-01-25 RX ADMIN — HYDROMORPHONE HYDROCHLORIDE 1 MILLIGRAM(S): 2 INJECTION INTRAMUSCULAR; INTRAVENOUS; SUBCUTANEOUS at 19:44

## 2019-01-25 RX ADMIN — ENOXAPARIN SODIUM 30 MILLIGRAM(S): 100 INJECTION SUBCUTANEOUS at 11:48

## 2019-01-25 RX ADMIN — Medication 1000 MILLIGRAM(S): at 10:05

## 2019-01-25 RX ADMIN — GABAPENTIN 300 MILLIGRAM(S): 400 CAPSULE ORAL at 11:48

## 2019-01-25 RX ADMIN — Medication 400 MILLIGRAM(S): at 02:19

## 2019-01-25 RX ADMIN — HYDROMORPHONE HYDROCHLORIDE 1 MILLIGRAM(S): 2 INJECTION INTRAMUSCULAR; INTRAVENOUS; SUBCUTANEOUS at 08:35

## 2019-01-25 RX ADMIN — HYDROMORPHONE HYDROCHLORIDE 0.5 MILLIGRAM(S): 2 INJECTION INTRAMUSCULAR; INTRAVENOUS; SUBCUTANEOUS at 14:05

## 2019-01-25 RX ADMIN — Medication 1000 MILLIGRAM(S): at 02:50

## 2019-01-25 RX ADMIN — HYDROMORPHONE HYDROCHLORIDE 1 MILLIGRAM(S): 2 INJECTION INTRAMUSCULAR; INTRAVENOUS; SUBCUTANEOUS at 09:50

## 2019-01-25 RX ADMIN — HYDROMORPHONE HYDROCHLORIDE 1 MILLIGRAM(S): 2 INJECTION INTRAMUSCULAR; INTRAVENOUS; SUBCUTANEOUS at 14:20

## 2019-01-25 RX ADMIN — HYDROMORPHONE HYDROCHLORIDE 0.5 MILLIGRAM(S): 2 INJECTION INTRAMUSCULAR; INTRAVENOUS; SUBCUTANEOUS at 04:40

## 2019-01-25 RX ADMIN — Medication 650 MILLIGRAM(S): at 20:45

## 2019-01-25 RX ADMIN — ONDANSETRON 4 MILLIGRAM(S): 8 TABLET, FILM COATED ORAL at 18:16

## 2019-01-25 RX ADMIN — HYDROMORPHONE HYDROCHLORIDE 0.5 MILLIGRAM(S): 2 INJECTION INTRAMUSCULAR; INTRAVENOUS; SUBCUTANEOUS at 22:07

## 2019-01-25 RX ADMIN — Medication 400 MILLIGRAM(S): at 09:50

## 2019-01-25 RX ADMIN — ENOXAPARIN SODIUM 30 MILLIGRAM(S): 100 INJECTION SUBCUTANEOUS at 23:13

## 2019-01-25 RX ADMIN — SODIUM CHLORIDE 1500 MILLILITER(S): 9 INJECTION, SOLUTION INTRAVENOUS at 21:56

## 2019-01-25 RX ADMIN — Medication 650 MILLIGRAM(S): at 19:45

## 2019-01-25 RX ADMIN — HYDROMORPHONE HYDROCHLORIDE 1 MILLIGRAM(S): 2 INJECTION INTRAMUSCULAR; INTRAVENOUS; SUBCUTANEOUS at 08:20

## 2019-01-25 RX ADMIN — CHLORHEXIDINE GLUCONATE 1 APPLICATION(S): 213 SOLUTION TOPICAL at 11:47

## 2019-01-25 RX ADMIN — OXYCODONE HYDROCHLORIDE 5 MILLIGRAM(S): 5 TABLET ORAL at 14:55

## 2019-01-25 RX ADMIN — HYDROMORPHONE HYDROCHLORIDE 0.5 MILLIGRAM(S): 2 INJECTION INTRAMUSCULAR; INTRAVENOUS; SUBCUTANEOUS at 08:47

## 2019-01-25 RX ADMIN — HYDROMORPHONE HYDROCHLORIDE 0.5 MILLIGRAM(S): 2 INJECTION INTRAMUSCULAR; INTRAVENOUS; SUBCUTANEOUS at 09:02

## 2019-01-25 RX ADMIN — HYDROMORPHONE HYDROCHLORIDE 0.5 MILLIGRAM(S): 2 INJECTION INTRAMUSCULAR; INTRAVENOUS; SUBCUTANEOUS at 14:20

## 2019-01-25 RX ADMIN — Medication 50 GRAM(S): at 00:18

## 2019-01-25 RX ADMIN — SODIUM CHLORIDE 1500 MILLILITER(S): 9 INJECTION, SOLUTION INTRAVENOUS at 20:03

## 2019-01-25 NOTE — DISCHARGE NOTE ADULT - PATIENT PORTAL LINK FT
You can access the [facility] Patient Portal, offered by [facility], by registering with the following website: [email or web address]

## 2019-01-25 NOTE — DISCHARGE NOTE ADULT - HOSPITAL COURSE
27 y/o male BIBEMS after large, heavy fiber-glass plate fell on him at work. Patient was pinned underneath plate and had to be extricated by co-workers. Denies LOC. Was wearing hard hat at time of the accident. He presents to trauma bay complaining of low back pain. Denies SOB, CP, palpitations, nausea, vomiting, lightheadedness or dizziness. Airway: intact, cervical collar in place on arrival. Breathing: breath sounds CTA b/l, no accessory muscle use, no conversational dyspnea. Circulation: 2+ central and peripheral pulses b/l. Disability: GCS15, pupils 3mm round and reactive to light b/l. Exposure: fully exposed in trauma bay and covered with warm blankets. B/l upper and lower extremities with motor and sensation grossly intact. CXR and abd/pelvis XR performed in trauma bay - question of lumbar vertebrae fx visualized in XR. Rcvd 50mcg fentanyl x2 and 30mg toradol in trauma bay. Transported to CT scan on monitor.     Hospital Course: CT head and cervical spine on admission revealed no acute traumatic injuries. CT chest abd & pelvis showed ACTIVE ARTERIAL CONTRAST EXTRAVASATION INTO THE LEFT SPACE OF RETZIUS DISPLACING BLADDER AND PROSTATE; FRACTURE LEFT INFERIOR PUBIC RAMUS AND THE RIGHT SACRUM AS DESCRIBED; bone spicules within the RIGHT S1-S2 foramen; presacral hematoma without active extravasation; remaining chest, abdomen and pelvic visceral anatomy unremarkable. Lumbar spine CT reformat also demonstrated acute comminuted displaced fracture of the right hemisacrum and sacral ala involving the articulating facets, extending into the sacral foraminal and right sacroiliac joint. Patient was admitted to the trauma service (SICU) and orthopedics consulted - stated he could WBAT with existing injuries. About 90 minutes after admission he dropped SBP to 75. Patient responded to 1 liter LR but requested 2 units emergency release blood, placed a central line and arranged pelvic angio. Taken to IR where embolization of pudendal artery was performed. Pt returned to SICU post procedure. Code sepsis was called on 1/25 PM for tachycardia - work-up revealed likely secondary to dehydration, however urine cx during work-up resulted as positive. Patient asymptomatic - no complaints of dysuria, per attending most likely asymptomatic bacteruria. Patient's Hgb trended - slowly downtrended however then plateaued and remained stable. He has been OOB working with PT, OT, and PM&R and recommendation is for acute rehab. At time of d/c he is hemodynamically well, tolerating diet, pain controlled, OOB, voiding, and having bowel fxn.    Patient is advised to RETURN TO THE EMERGENCY DEPARTMENT for any of the following - worsening pain, fever/chills, nausea/vomiting, altered mental status, chest pain, shortness of breath, or any other new / worsening symptom. 29 y/o male BIBEMS after large, heavy fiber-glass plate fell on him at work. Patient was pinned underneath plate and had to be extricated by co-workers. Denies LOC. Was wearing hard hat at time of the accident. He presents to trauma bay complaining of low back pain. Denies SOB, CP, palpitations, nausea, vomiting, lightheadedness or dizziness. Airway: intact, cervical collar in place on arrival. Breathing: breath sounds CTA b/l, no accessory muscle use, no conversational dyspnea. Circulation: 2+ central and peripheral pulses b/l. Disability: GCS15, pupils 3mm round and reactive to light b/l. Exposure: fully exposed in trauma bay and covered with warm blankets. B/l upper and lower extremities with motor and sensation grossly intact. CXR and abd/pelvis XR performed in trauma bay - question of lumbar vertebrae fx visualized in XR. Rcvd 50mcg fentanyl x2 and 30mg toradol in trauma bay. Transported to CT scan on monitor.     Hospital Course: CT head and cervical spine on admission revealed no acute traumatic injuries. CT chest abd & pelvis showed ACTIVE ARTERIAL CONTRAST EXTRAVASATION INTO THE LEFT SPACE OF RETZIUS DISPLACING BLADDER AND PROSTATE; FRACTURE LEFT INFERIOR PUBIC RAMUS AND THE RIGHT SACRUM AS DESCRIBED; bone spicules within the RIGHT S1-S2 foramen; presacral hematoma without active extravasation; remaining chest, abdomen and pelvic visceral anatomy unremarkable. Lumbar spine CT reformat also demonstrated acute comminuted displaced fracture of the right hemisacrum and sacral ala involving the articulating facets, extending into the sacral foraminal and right sacroiliac joint. Patient was admitted to the trauma service (SICU) and orthopedics consulted - stated he could WBAT with existing injuries. About 90 minutes after admission he dropped SBP to 75. Patient responded to 1 liter LR but requested 2 units emergency release blood, placed a central line and arranged pelvic angio. Taken to IR where embolization of pudendal artery was performed. Pt returned to SICU post procedure. Code sepsis was called on 1/25 PM for tachycardia - work-up revealed likely secondary to dehydration, however urine cx during work-up resulted as positive. Patient asymptomatic - no complaints of dysuria, per attending most likely asymptomatic bacteruria. Patient's Hgb trended - slowly downtrended however then plateaued and remained stable. He has been OOB working with PT, OT, and PM&R and recommendation is for acute rehab. CT Scan 1/30 with increased size of pelvic hematoma but no active extravasation.  At time of d/c he is hemodynamically well, tolerating diet, pain controlled, OOB, voiding, and having bowel fxn.    Patient is advised to RETURN TO THE EMERGENCY DEPARTMENT for any of the following - worsening pain, fever/chills, nausea/vomiting, altered mental status, chest pain, shortness of breath, or any other new / worsening symptom.

## 2019-01-25 NOTE — DISCHARGE NOTE ADULT - PLAN OF CARE
improve fracture healing Patient may TTWB right LE and WBAT Left LE. Patient is to follow up with Dr Morrow in 1-2 weeks as out patient. Alleviation of pain and symptoms Follow up: Please call and make an appointment with Dr. Morrow (orthopedic surgeon) and with the [facility] 1-2 weeks after discharge. Also, please call and make an appointment with your primary care physician as per your usual schedule.     Activity: Please remain toe-touch weight bearing to RLE and weight bearing as tolerated to LLE.    Diet: May continue regular diet.    Medications: Please take all home medications as prescribed by your primary care doctor. Pain medication has been prescribed for you. Please, take it as it has been prescribed, do not drive or operate heavy machinery while taking narcotics.  You are encouraged to take over-the-counter non-narcotic medications such as tylenol and/or ibuprofen for pain relief when you feel your pain no longer warrants the use of narcotic pain medications.    Patient is advised to RETURN TO THE EMERGENCY DEPARTMENT for any of the following - worsening pain, fever/chills, nausea/vomiting, altered mental status, chest pain, shortness of breath, or any other new / worsening symptom.

## 2019-01-25 NOTE — DISCHARGE NOTE ADULT - CARE PLAN
Principal Discharge DX:	Pelvic fracture  Goal:	improve fracture healing  Assessment and plan of treatment:	Patient may TTWB right LE and WBAT Left LE. Patient is to follow up with Dr Morrow in 1-2 weeks as out patient.  Secondary Diagnosis:	Sacral fracture, closed  Secondary Diagnosis:	Pubic ramus fracture, left, closed, initial encounter Principal Discharge DX:	Pelvic fracture  Goal:	Alleviation of pain and symptoms  Assessment and plan of treatment:	Follow up: Please call and make an appointment with Dr. Morrow (orthopedic surgeon) and with the Acute Care Surgery Clinic 1-2 weeks after discharge. Also, please call and make an appointment with your primary care physician as per your usual schedule.     Activity: Please remain toe-touch weight bearing to RLE and weight bearing as tolerated to LLE.    Diet: May continue regular diet.    Medications: Please take all home medications as prescribed by your primary care doctor. Pain medication has been prescribed for you. Please, take it as it has been prescribed, do not drive or operate heavy machinery while taking narcotics.  You are encouraged to take over-the-counter non-narcotic medications such as tylenol and/or ibuprofen for pain relief when you feel your pain no longer warrants the use of narcotic pain medications.    Patient is advised to RETURN TO THE EMERGENCY DEPARTMENT for any of the following - worsening pain, fever/chills, nausea/vomiting, altered mental status, chest pain, shortness of breath, or any other new / worsening symptom.  Secondary Diagnosis:	Sacral fracture, closed  Secondary Diagnosis:	Pubic ramus fracture, left, closed, initial encounter

## 2019-01-25 NOTE — CHART NOTE - NSCHARTNOTEFT_GEN_A_CORE
Code sepsis was called on Sai Marti.   Rapid Response PGY 2/ PGY 3 Note    [id number]  WM MARTI    Code sepsis was called on a 28y year old Male patient for rectal temp of 101.5, . Rest of vital: 128/70 BP, O2 sat 100%   Patient has no PMH.     Patient was seen and examined at the bedside by the rapid response team. Patient is sitting up in bed, doing well, alert, denies CP, SOB, n/v, fever/chills, says the only pain he has is if he touches the area where the angiogram was performed, and with ambulation due to fractures.   Patient is POD #1 after ortho procedure.       Allergies    Allergy Status Unknown    Intolerances        PAST MEDICAL & SURGICAL HISTORY:  No pertinent past medical history  No significant past surgical history      Vital Signs Last 24 Hrs  T(C): 38.6 (25 Jan 2019 19:28), Max: 38.7 (25 Jan 2019 19:15)  T(F): 101.5 (25 Jan 2019 19:28), Max: 101.6 (25 Jan 2019 19:15)  HR: 104 (25 Jan 2019 19:28) (67 - 104)  BP: 121/72 (25 Jan 2019 19:15) (84/49 - 121/72)  BP(mean): 76 (25 Jan 2019 16:00) (62 - 81)  RR: 18 (25 Jan 2019 19:15) (10 - 24)  SpO2: 94% (25 Jan 2019 19:15) (93% - 99%)          PHYSICAL EXAM:    PHYSICAL EXAM:      Constitutional: NAD, well-groomed, well-developed, smiling, conversational  HEENT: PERRLA, EOMI, Normal Hearing  Neck: No JVD, supple  Back: Normal spine flexure, No CVA tenderness  Respiratory: CTABL no w/r/r  Cardiovascular: S1 and S2, RRR, no m/r/g  Gastrointestinal: BS+ normoactive, soft, ND, NTTP   Extremities: No c/c/e  Vascular: 2+ peripheral pulses  Neurological: AAO x 3, no focal deficits  Psychiatric: Normal mood, normal affect  Musculoskeletal: 5/5 strength b/l upper and lower extremities  Skin: No rashes; R groin site C/D/I, faint dried blood, no active discharge/no erythema          01-24 @ 07:01 - 01-25 @ 07:00  --------------------------------------------------------  IN: 1675 mL / OUT: 515 mL / NET: 1160 mL    01-25 @ 07:01 - 01-25 @ 19:59  --------------------------------------------------------  IN: 725 mL / OUT: 600 mL / NET: 125 mL                              10.5   9.3   )-----------( 137      ( 25 Jan 2019 05:21 )             31.3     01-25    141  |  108<H>  |  16.0  ----------------------------<  128<H>  4.2   |  24.0  |  0.91    Ca    8.1<L>      25 Jan 2019 05:21  Phos  2.9     01-25  Mg     2.3     01-25    TPro  7.3  /  Alb  4.2  /  TBili  0.4  /  DBili  x   /  AST  32  /  ALT  33  /  AlkPhos  76  01-24         LIVER FUNCTIONS - ( 24 Jan 2019 12:30 )  Alb: 4.2 g/dL / Pro: 7.3 g/dL / ALK PHOS: 76 U/L / ALT: 33 U/L / AST: 32 U/L / GGT: x              PT/INR - ( 24 Jan 2019 22:22 )   PT: 13.5 sec;   INR: 1.17 ratio         PTT - ( 24 Jan 2019 22:22 )  PTT:24.4 sec    Vital Signs Last 24 Hrs*       Assessment- Rapid Response called for 28y year old Male with  no PMH. Sepsis likely due to postop fever.      Plan-  -spoke to ortho team, recommending no antibiotics at this time.   -will c/t with 30cc/kg bolus ordered for 2800cc based on weight  -lungs clear, will c/t fluids for now, CXR clear  -EKG sinus rhythm, tachycardia likely 2/2 dehydration, pain is well controlled currently. Patient states was NPO yesterday for procedure and felt thirsty all day  -patient has not been ambulating due to pain, as per ortho recs will encourage amb as tolerated, OOB to chair  -bedside incentive spirometer ordered and brought to bedside, education by nurse given  -lactate , CBC, BMP, Mg, P, PT/INR sent, blood cultures x 2 sent  -bladder scan 200 cc, no dysuria, no discomfort, will wait for urination to send urine culture  -case d/w orthopedic team, will f/up lactate  -debriefed with team  -family at bedside, all questions answered Code sepsis was called on Sai Marti.   Rapid Response PGY 2/ PGY 3 Note    [id number]  WM MARTI    Code sepsis was called on a 28y year old Male patient for rectal temp of 101.5, . Rest of vital: 128/70 BP, O2 sat 100%   Patient has no PMH.     Patient was seen and examined at the bedside by the rapid response team. Patient is sitting up in bed, doing well, alert, denies CP, SOB, n/v, fever/chills, says the only pain he has is if he touches the area where the angiogram was performed, and with ambulation due to fractures.   Patient is POD #1 after ortho procedure.       Allergies    Allergy Status Unknown    Intolerances        PAST MEDICAL & SURGICAL HISTORY:  No pertinent past medical history  No significant past surgical history      Vital Signs Last 24 Hrs  T(C): 38.6 (25 Jan 2019 19:28), Max: 38.7 (25 Jan 2019 19:15)  T(F): 101.5 (25 Jan 2019 19:28), Max: 101.6 (25 Jan 2019 19:15)  HR: 104 (25 Jan 2019 19:28) (67 - 104)  BP: 121/72 (25 Jan 2019 19:15) (84/49 - 121/72)  BP(mean): 76 (25 Jan 2019 16:00) (62 - 81)  RR: 18 (25 Jan 2019 19:15) (10 - 24)  SpO2: 94% (25 Jan 2019 19:15) (93% - 99%)          PHYSICAL EXAM:    PHYSICAL EXAM:      Constitutional: NAD, well-groomed, well-developed, smiling, conversational  HEENT: PERRLA, EOMI, Normal Hearing  Neck: No JVD, supple  Back: Normal spine flexure, No CVA tenderness  Respiratory: CTABL no w/r/r  Cardiovascular: S1 and S2, RRR, no m/r/g  Gastrointestinal: BS+ normoactive, soft, ND, NTTP   Extremities: No c/c/e  Vascular: 2+ peripheral pulses  Neurological: AAO x 3, no focal deficits  Psychiatric: Normal mood, normal affect  Musculoskeletal: 5/5 strength b/l upper and lower extremities  Skin: No rashes; R groin site C/D/I, faint dried blood, no active discharge/no erythema          01-24 @ 07:01 - 01-25 @ 07:00  --------------------------------------------------------  IN: 1675 mL / OUT: 515 mL / NET: 1160 mL    01-25 @ 07:01 - 01-25 @ 19:59  --------------------------------------------------------  IN: 725 mL / OUT: 600 mL / NET: 125 mL                              10.5   9.3   )-----------( 137      ( 25 Jan 2019 05:21 )             31.3     01-25    141  |  108<H>  |  16.0  ----------------------------<  128<H>  4.2   |  24.0  |  0.91    Ca    8.1<L>      25 Jan 2019 05:21  Phos  2.9     01-25  Mg     2.3     01-25    TPro  7.3  /  Alb  4.2  /  TBili  0.4  /  DBili  x   /  AST  32  /  ALT  33  /  AlkPhos  76  01-24         LIVER FUNCTIONS - ( 24 Jan 2019 12:30 )  Alb: 4.2 g/dL / Pro: 7.3 g/dL / ALK PHOS: 76 U/L / ALT: 33 U/L / AST: 32 U/L / GGT: x              PT/INR - ( 24 Jan 2019 22:22 )   PT: 13.5 sec;   INR: 1.17 ratio         PTT - ( 24 Jan 2019 22:22 )  PTT:24.4 sec    Vital Signs Last 24 Hrs*       Assessment- Rapid Response called for 28y year old Male with  no PMH. Sepsis likely due to postop fever.      Plan-  -spoke to ortho team, recommending no antibiotics at this time.   -will c/t with 30cc/kg bolus ordered for 2800cc based on weight  -lungs clear, will c/t fluids for now, CXR clear  -EKG sinus rhythm, tachycardia likely 2/2 dehydration, pain is well controlled currently. Patient states was NPO yesterday for procedure and felt thirsty all day  -patient has not been ambulating due to pain, as per ortho recs will encourage amb as tolerated, OOB to chair  -bedside incentive spirometer ordered and brought to bedside, education by nurse given  -lactate , CBC, BMP, Mg, P, PT/INR sent, blood cultures x 2 sent  -bladder scan 200 cc, no dysuria, no discomfort, will wait for urination to send urine culture  -case d/w orthopedic team, will f/up lactate  -debriefed with team  -family at bedside, all questions answered  ----  Lactate 1.0   will d/c remainder of fluids, s/p 1.8 L

## 2019-01-25 NOTE — DISCHARGE NOTE ADULT - MEDICATION SUMMARY - MEDICATIONS TO TAKE
I will START or STAY ON the medications listed below when I get home from the hospital:    acetaminophen 325 mg oral tablet  -- 2 tab(s) by mouth every 6 hours  -- Indication: For Pain    ibuprofen 400 mg oral tablet  -- 1 tab(s) by mouth every 8 hours, As needed, Mild Pain (1 - 3)  -- Indication: For Pain    oxyCODONE 5 mg oral tablet  -- 1 tab(s) by mouth every 4 hours, As needed, Moderate Pain (4 - 6)  -- Indication: For Pain    oxyCODONE 10 mg oral tablet  -- 1 tab(s) by mouth every 4 hours, As needed, Severe Pain (7 - 10)  -- Indication: For Pain    magnesium hydroxide 8% oral suspension  -- 30 milliliter(s) by mouth once a day  -- Indication: For Constipation    enoxaparin  -- 40 milligram(s) subcutaneous once a day  -- Indication: For DVT ppx    gabapentin 300 mg oral capsule  -- 1 cap(s) by mouth every 8 hours  -- Indication: For Pain    lidocaine 5% topical film  -- Apply on skin to affected area once a day.  Patch may remain in place for up to 12 hours in any 24-hour period.  * External Use Only *  -- Indication: For Pain    docusate sodium 100 mg oral capsule  -- 1 cap(s) by mouth 3 times a day  -- Indication: For Constipation    polyethylene glycol 3350 oral powder for reconstitution  -- 17 gram(s) by mouth once a day  -- Indication: For Constipation    senna oral tablet  -- 2 tab(s) by mouth once a day (at bedtime)  -- Indication: For Constipation

## 2019-01-25 NOTE — DISCHARGE NOTE ADULT - CARE PROVIDER_API CALL
Isai Morrow), Orthopaedic Surgery  [address] [place] [address]  Phone: [phone]  Fax: [phone] Isai Morrow), Orthopaedic Surgery  [address] [place] [address]  Phone: [phone]  Fax: [phone]    [facility],   [address] [place] [address] - [address] [place] [address]  Phone: [phone]  Fax: (   )    -

## 2019-01-25 NOTE — DISCHARGE NOTE ADULT - CARE PROVIDERS DIRECT ADDRESSES
[email or web address]

## 2019-01-25 NOTE — PROGRESS NOTE ADULT - SUBJECTIVE AND OBJECTIVE BOX
INTERVAL HPI/OVERNIGHT EVENTS/SUBJECTIVE:  IR completed pudendal artery embolization last night without complications. Patient remains afebrile and hemodynamically stable with MAPs in the 70s and H/H stable at 10.5/31.3. He reports mild pain localized to lower back without radiation, numbness or tingling in lower extremities. Patient denies any other complaints.     ICU Vital Signs Last 24 Hrs  T(C): 37.2 (25 Jan 2019 12:01), Max: 37.5 (25 Jan 2019 00:05)  T(F): 98.9 (25 Jan 2019 12:01), Max: 99.5 (25 Jan 2019 00:05)  HR: 80 (25 Jan 2019 12:00) (67 - 97)  BP: 113/61 (25 Jan 2019 12:00) (84/49 - 116/57)  BP(mean): 81 (25 Jan 2019 12:00) (62 - 81)  ABP: --  ABP(mean): --  RR: 20 (25 Jan 2019 12:00) (11 - 24)  SpO2: 98% (25 Jan 2019 12:00) (93% - 99%)      I&O's Detail    24 Jan 2019 07:01  -  25 Jan 2019 07:00  --------------------------------------------------------  IN:    IV PiggyBack: 50 mL    lactated ringers.: 1625 mL  Total IN: 1675 mL    OUT:    Indwelling Catheter - Urethral: 515 mL  Total OUT: 515 mL    Total NET: 1160 mL      25 Jan 2019 07:01  -  25 Jan 2019 13:09  --------------------------------------------------------  IN:    IV PiggyBack: 100 mL    lactated ringers.: 625 mL  Total IN: 725 mL    OUT:    Indwelling Catheter - Urethral: 320 mL  Total OUT: 320 mL    Total NET: 405 mL                MEDICATIONS  (STANDING):  acetaminophen   Tablet .. 650 milliGRAM(s) Oral every 6 hours  chlorhexidine 2% Cloths 1 Application(s) Topical daily  enoxaparin Injectable 30 milliGRAM(s) SubCutaneous every 12 hours  gabapentin 300 milliGRAM(s) Oral every 8 hours  lidocaine   Patch 1 Patch Transdermal daily    MEDICATIONS  (PRN):  HYDROmorphone  Injectable 1 milliGRAM(s) IV Push every 4 hours PRN Severe Pain (7 - 10)  HYDROmorphone  Injectable 0.5 milliGRAM(s) IV Push every 3 hours PRN Break through pain  ibuprofen  Tablet. 400 milliGRAM(s) Oral every 8 hours PRN Mild Pain (1 - 3)  oxyCODONE    IR 5 milliGRAM(s) Oral every 4 hours PRN Moderate Pain (4 - 6)      NUTRITION/IVF: Regular diet    CENTRAL LINE:  LOCATION: Right SC TLC  DATE INSERTED:  1/24/19    ESCOTO:  1/24/19      PHYSICAL EXAM:     Gen: Patient lying in bed, appears comfortable, NAD    Eyes:  EOMI     Neurological: A&Ox3, GCS 15, No focal deficit.     Neck: Supple, NCAT, FROM no pain.     Pulmonary: NAD, CTAB.      Cardiovascular: RRR, S1, S2, No Murmurs, rubs or gallops noted.    Gastrointestinal: Soft, NT, ND    Genitourinary: Producing yellow urine    Back: Limited ROM 2/2 pain from fractures. Tender to sacral palpation.    Extremities: NT, AT, no edema, erythema, FROM, pulses 2+ throughout    Skin: Warm, dry, no ecchymosis         LABS:  CBC Full  -  ( 25 Jan 2019 05:21 )  WBC Count : 9.3 K/uL  Hemoglobin : 10.5 g/dL  Hematocrit : 31.3 %  Platelet Count - Automated : 137 K/uL  Mean Cell Volume : 87.2 fl  Mean Cell Hemoglobin : 29.2 pg  Mean Cell Hemoglobin Concentration : 33.5 g/dL  Auto Neutrophil # : 6.5 K/uL  Auto Lymphocyte # : 1.6 K/uL  Auto Monocyte # : 1.0 K/uL  Auto Eosinophil # : 0.1 K/uL  Auto Basophil # : 0.0 K/uL  Auto Neutrophil % : 70.2 %  Auto Lymphocyte % : 17.7 %  Auto Monocyte % : 10.3 %  Auto Eosinophil % : 1.3 %  Auto Basophil % : 0.3 %    01-25    141  |  108<H>  |  16.0  ----------------------------<  128<H>  4.2   |  24.0  |  0.91    Ca    8.1<L>      25 Jan 2019 05:21  Phos  2.9     01-25  Mg     2.3     01-25    TPro  7.3  /  Alb  4.2  /  TBili  0.4  /  DBili  x   /  AST  32  /  ALT  33  /  AlkPhos  76  01-24    PT/INR - ( 24 Jan 2019 22:22 )   PT: 13.5 sec;   INR: 1.17 ratio         PTT - ( 24 Jan 2019 22:22 )  PTT:24.4 sec    RECENT CULTURES:      LIVER FUNCTIONS - ( 24 Jan 2019 12:30 )  Alb: 4.2 g/dL / Pro: 7.3 g/dL / ALK PHOS: 76 U/L / ALT: 33 U/L / AST: 32 U/L / GGT: x               CAPILLARY BLOOD GLUCOSE      RADIOLOGY & ADDITIONAL STUDIES:    ASSESSMENT/PLAN:    28y Male trauma patient presenting with L inferior pubic rami fx, sacral fx, and sacral hematoma(s/p pudendal artery embolization), for downgrade to floor      Neurological: Continue multi modal analgesics, attempt to wean off narcotics    Pulmonary: No issues, encourage incentive spirometry     Cardiovascular: Hemodynamically stable, monitor MAPs    Gastrointestinal: No issues, transition to regular diet     Genitourinary: Continue escoto, UO currently 30-40cc/hr, monitor for change     Heme: Begin Lovenox 30mg BID, SCDs; duplex to be done today, will f/u results.     ID: No issues    Skin: Provide regular skin care/hygiene     Lines/ Tubes: TLC to be removed, will ensure peripheral access     MSK: NWB at present. Fractures considered stable/non op. Ortho requests updated pelvic xray with inlet/outlet views, will reassess potential for weight bearing pending results. PT consult.     Dispo: Transfer to floor            CRITICAL CARE TIME SPENT: 30 INTERVAL HPI/OVERNIGHT EVENTS/SUBJECTIVE:  IR completed pudendal artery embolization last night without complications. Patient remains afebrile and hemodynamically stable with MAPs in the 70s and H/H stable at 10.5/31.3. He reports mild pain localized to lower back without radiation, numbness or tingling in lower extremities. Patient denies any other complaints.     ICU Vital Signs Last 24 Hrs  T(C): 37.2 (25 Jan 2019 12:01), Max: 37.5 (25 Jan 2019 00:05)  T(F): 98.9 (25 Jan 2019 12:01), Max: 99.5 (25 Jan 2019 00:05)  HR: 80 (25 Jan 2019 12:00) (67 - 97)  BP: 113/61 (25 Jan 2019 12:00) (84/49 - 116/57)  BP(mean): 81 (25 Jan 2019 12:00) (62 - 81)  ABP: --  ABP(mean): --  RR: 20 (25 Jan 2019 12:00) (11 - 24)  SpO2: 98% (25 Jan 2019 12:00) (93% - 99%)      I&O's Detail    24 Jan 2019 07:01  -  25 Jan 2019 07:00  --------------------------------------------------------  IN:    IV PiggyBack: 50 mL    lactated ringers.: 1625 mL  Total IN: 1675 mL    OUT:    Indwelling Catheter - Urethral: 515 mL  Total OUT: 515 mL    Total NET: 1160 mL      25 Jan 2019 07:01  -  25 Jan 2019 13:09  --------------------------------------------------------  IN:    IV PiggyBack: 100 mL    lactated ringers.: 625 mL  Total IN: 725 mL    OUT:    Indwelling Catheter - Urethral: 320 mL  Total OUT: 320 mL    Total NET: 405 mL                MEDICATIONS  (STANDING):  acetaminophen   Tablet .. 650 milliGRAM(s) Oral every 6 hours  chlorhexidine 2% Cloths 1 Application(s) Topical daily  enoxaparin Injectable 30 milliGRAM(s) SubCutaneous every 12 hours  gabapentin 300 milliGRAM(s) Oral every 8 hours  lidocaine   Patch 1 Patch Transdermal daily    MEDICATIONS  (PRN):  HYDROmorphone  Injectable 1 milliGRAM(s) IV Push every 4 hours PRN Severe Pain (7 - 10)  HYDROmorphone  Injectable 0.5 milliGRAM(s) IV Push every 3 hours PRN Break through pain  ibuprofen  Tablet. 400 milliGRAM(s) Oral every 8 hours PRN Mild Pain (1 - 3)  oxyCODONE    IR 5 milliGRAM(s) Oral every 4 hours PRN Moderate Pain (4 - 6)      NUTRITION/IVF: Regular diet    CENTRAL LINE:  LOCATION: Right SC TLC  DATE INSERTED:  1/24/19    LONG:  1/24/19      PHYSICAL EXAM:     Gen: Patient lying in bed, appears comfortable, NAD    Eyes:  EOMI     Neurological: A&Ox3, GCS 15, No focal deficit.     Neck: Supple, NCAT, FROM no pain.     Pulmonary: NAD, CTAB.      Cardiovascular: RRR, S1, S2, No Murmurs, rubs or gallops noted.    Gastrointestinal: Soft, NT, ND    Genitourinary: Producing yellow urine    Back: Limited ROM 2/2 pain from fractures. Tender to sacral palpation.    Extremities: NT, AT, no edema, erythema, FROM, pulses 2+ throughout    Skin: Warm, dry, no ecchymosis         LABS:  CBC Full  -  ( 25 Jan 2019 05:21 )  WBC Count : 9.3 K/uL  Hemoglobin : 10.5 g/dL  Hematocrit : 31.3 %  Platelet Count - Automated : 137 K/uL  Mean Cell Volume : 87.2 fl  Mean Cell Hemoglobin : 29.2 pg  Mean Cell Hemoglobin Concentration : 33.5 g/dL  Auto Neutrophil # : 6.5 K/uL  Auto Lymphocyte # : 1.6 K/uL  Auto Monocyte # : 1.0 K/uL  Auto Eosinophil # : 0.1 K/uL  Auto Basophil # : 0.0 K/uL  Auto Neutrophil % : 70.2 %  Auto Lymphocyte % : 17.7 %  Auto Monocyte % : 10.3 %  Auto Eosinophil % : 1.3 %  Auto Basophil % : 0.3 %    01-25    141  |  108<H>  |  16.0  ----------------------------<  128<H>  4.2   |  24.0  |  0.91    Ca    8.1<L>      25 Jan 2019 05:21  Phos  2.9     01-25  Mg     2.3     01-25    TPro  7.3  /  Alb  4.2  /  TBili  0.4  /  DBili  x   /  AST  32  /  ALT  33  /  AlkPhos  76  01-24    PT/INR - ( 24 Jan 2019 22:22 )   PT: 13.5 sec;   INR: 1.17 ratio         PTT - ( 24 Jan 2019 22:22 )  PTT:24.4 sec    RECENT CULTURES:      LIVER FUNCTIONS - ( 24 Jan 2019 12:30 )  Alb: 4.2 g/dL / Pro: 7.3 g/dL / ALK PHOS: 76 U/L / ALT: 33 U/L / AST: 32 U/L / GGT: x               CAPILLARY BLOOD GLUCOSE      RADIOLOGY & ADDITIONAL STUDIES:    ASSESSMENT/PLAN:    28y Male trauma patient presenting with L inferior pubic rami fx, sacral fx, and sacral hematoma(s/p pudendal artery embolization),      Neurological: Continue multi modal analgesics, attempt to wean off narcotics    Pulmonary: No issues, encourage incentive spirometry     Cardiovascular: Hemodynamically stable, monitor MAPs    Gastrointestinal: No issues, transition to regular diet     Genitourinary: Will DC Long and perform TOV    Heme: Begin Lovenox 30mg BID, SCDs; duplex to be done today, will f/u results.     ID: No issues    Skin: Provide regular skin care/hygiene     Lines/ Tubes: TLC to be removed, will ensure peripheral access     MSK: NWB at present. Fractures considered stable/non op. Ortho requests updated pelvic xray with inlet/outlet views, will reassess potential for weight bearing pending results. PT consult.     Dispo: Stable for Transfer to floor      CARE TIME SPENT: 25 minutes

## 2019-01-25 NOTE — DISCHARGE NOTE ADULT - PROVIDER TOKENS
RIKKI:'[id number]' TOKEN:'[id number]',FREE:[LAST:[[facility]],PHONE:[phone]],FAX:[(   )    -],ADDRESS:[[address] [place] [address]]]

## 2019-01-25 NOTE — PROGRESS NOTE ADULT - ASSESSMENT
28y Male trauma patient presenting with L inferior pubic rami fx, sacral fx, and sacral hematoma(s/p pudendal artery embolization),      Neurological: Continue multi modal analgesics, attempt to wean off narcotics    Pulmonary: No issues, encourage incentive spirometry     Cardiovascular: Hemodynamically stable, monitor MAPs    Gastrointestinal: No issues, transition to regular diet     Genitourinary: Will DC Drake and perform TOV    Heme: Begin Lovenox 30mg BID, SCDs; duplex to be done today, will f/u results.     ID: No issues    Skin: Provide regular skin care/hygiene     Lines/ Tubes: TLC to be removed, will ensure peripheral access     MSK: NWB at present. Fractures considered stable/non op. Ortho requests updated pelvic xray with inlet/outlet views, will reassess potential for weight bearing pending results. PT consult.     Dispo: Stable for Transfer to floor      CARE TIME SPENT: 25 minutes

## 2019-01-25 NOTE — PROGRESS NOTE ADULT - SUBJECTIVE AND OBJECTIVE BOX
Ortho Post Op Check    Name: WM LOPEZ    MR #: [id number]    Patient being followed for Right sacral fx with minimal displacement and left pubic rami fx  Surgeon:    Pt comfortable without complaints, pain controlled  Denies CP, SOB, N/V, numbness/tingling     General Exam:  Vital Signs Last 24 Hrs  T(C): 37.2 (01-25-19 @ 12:01), Max: 37.2 (01-25-19 @ 12:01)  T(F): 98.9 (01-25-19 @ 12:01), Max: 98.9 (01-25-19 @ 12:01)  HR: 80 (01-25-19 @ 12:00) (67 - 87)  BP: 113/61 (01-25-19 @ 12:00) (108/51 - 116/57)  BP(mean): 81 (01-25-19 @ 12:00) (73 - 81)  RR: 20 (01-25-19 @ 12:00) (13 - 24)  SpO2: 98% (01-25-19 @ 12:00) (95% - 99%)    General: Pt Alert and oriented, NAD, controlled pain.  Bilateral LE: Pulses: 2+ dorsalis pedis pulse. Cap refill < 2 sec.  Sensation: Grossly intact to light touch without deficit.  Motor: + EHL/FHL/TA/GS, Calves soft, supple and nontender                          10.5   9.3   )-----------( 137      ( 25 Jan 2019 05:21 )             31.3   25 Jan 2019 05:21    141    |  108    |  16.0   ----------------------------<  128    4.2     |  24.0   |  0.91     Phos  2.9       25 Jan 2019 05:21  Mg     2.3       25 Jan 2019 05:21    TPro  7.3    /  Alb  4.2    /  TBili  0.4    /  DBili  x      /  AST  32     /  ALT  33     /  AlkPhos  76     24 Jan 2019 12:30      Xray: Still awaiting pelvic xrays    A/P: 28yMale  Right sacral fx and left pubic rami fx  - Stable  - Pain Control  - DVT ppx: Lovenox  - Post op abx:  - PT eval pending  - Weight bearing status: TTWB with crutches once xrays are reviewed

## 2019-01-26 DIAGNOSIS — S32.10XA UNSPECIFIED FRACTURE OF SACRUM, INITIAL ENCOUNTER FOR CLOSED FRACTURE: ICD-10-CM

## 2019-01-26 DIAGNOSIS — S32.592A OTHER SPECIFIED FRACTURE OF LEFT PUBIS, INITIAL ENCOUNTER FOR CLOSED FRACTURE: ICD-10-CM

## 2019-01-26 LAB
ANION GAP SERPL CALC-SCNC: 8 MMOL/L — SIGNIFICANT CHANGE UP (ref 5–17)
BASOPHILS # BLD AUTO: 0 K/UL — SIGNIFICANT CHANGE UP (ref 0–0.2)
BASOPHILS # BLD AUTO: 0 K/UL — SIGNIFICANT CHANGE UP (ref 0–0.2)
BASOPHILS NFR BLD AUTO: 0.3 % — SIGNIFICANT CHANGE UP (ref 0–2)
BASOPHILS NFR BLD AUTO: 0.4 % — SIGNIFICANT CHANGE UP (ref 0–2)
BLD GP AB SCN SERPL QL: SIGNIFICANT CHANGE UP
BUN SERPL-MCNC: 8 MG/DL — SIGNIFICANT CHANGE UP (ref 8–20)
CALCIUM SERPL-MCNC: 7.8 MG/DL — LOW (ref 8.6–10.2)
CHLORIDE SERPL-SCNC: 100 MMOL/L — SIGNIFICANT CHANGE UP (ref 98–107)
CO2 SERPL-SCNC: 26 MMOL/L — SIGNIFICANT CHANGE UP (ref 22–29)
CREAT SERPL-MCNC: 0.68 MG/DL — SIGNIFICANT CHANGE UP (ref 0.5–1.3)
EOSINOPHIL # BLD AUTO: 0.1 K/UL — SIGNIFICANT CHANGE UP (ref 0–0.5)
EOSINOPHIL # BLD AUTO: 0.2 K/UL — SIGNIFICANT CHANGE UP (ref 0–0.5)
EOSINOPHIL NFR BLD AUTO: 1.3 % — SIGNIFICANT CHANGE UP (ref 0–6)
EOSINOPHIL NFR BLD AUTO: 3.7 % — SIGNIFICANT CHANGE UP (ref 0–5)
GLUCOSE SERPL-MCNC: 126 MG/DL — HIGH (ref 70–115)
HCT VFR BLD CALC: 24.6 % — LOW (ref 42–52)
HCT VFR BLD CALC: 25.5 % — LOW (ref 42–52)
HGB BLD-MCNC: 8.2 G/DL — LOW (ref 14–18)
HGB BLD-MCNC: 8.4 G/DL — LOW (ref 14–18)
LYMPHOCYTES # BLD AUTO: 1.4 K/UL — SIGNIFICANT CHANGE UP (ref 1–4.8)
LYMPHOCYTES # BLD AUTO: 1.6 K/UL — SIGNIFICANT CHANGE UP (ref 1–4.8)
LYMPHOCYTES # BLD AUTO: 13.4 % — LOW (ref 20–55)
LYMPHOCYTES # BLD AUTO: 24.3 % — SIGNIFICANT CHANGE UP (ref 20–55)
MAGNESIUM SERPL-MCNC: 1.9 MG/DL — SIGNIFICANT CHANGE UP (ref 1.6–2.6)
MCHC RBC-ENTMCNC: 28.9 PG — SIGNIFICANT CHANGE UP (ref 27–31)
MCHC RBC-ENTMCNC: 29.2 PG — SIGNIFICANT CHANGE UP (ref 27–31)
MCHC RBC-ENTMCNC: 32.9 G/DL — SIGNIFICANT CHANGE UP (ref 32–36)
MCHC RBC-ENTMCNC: 33.3 G/DL — SIGNIFICANT CHANGE UP (ref 32–36)
MCV RBC AUTO: 87.5 FL — SIGNIFICANT CHANGE UP (ref 80–94)
MCV RBC AUTO: 87.6 FL — SIGNIFICANT CHANGE UP (ref 80–94)
MONOCYTES # BLD AUTO: 0.6 K/UL — SIGNIFICANT CHANGE UP (ref 0–0.8)
MONOCYTES # BLD AUTO: 0.8 K/UL — SIGNIFICANT CHANGE UP (ref 0–0.8)
MONOCYTES NFR BLD AUTO: 8.4 % — SIGNIFICANT CHANGE UP (ref 3–10)
MONOCYTES NFR BLD AUTO: 8.6 % — SIGNIFICANT CHANGE UP (ref 3–10)
NEUTROPHILS # BLD AUTO: 4.3 K/UL — SIGNIFICANT CHANGE UP (ref 1.8–8)
NEUTROPHILS # BLD AUTO: 7.7 K/UL — SIGNIFICANT CHANGE UP (ref 1.8–8)
NEUTROPHILS NFR BLD AUTO: 62.9 % — SIGNIFICANT CHANGE UP (ref 37–73)
NEUTROPHILS NFR BLD AUTO: 76.3 % — HIGH (ref 37–73)
PHOSPHATE SERPL-MCNC: 2.7 MG/DL — SIGNIFICANT CHANGE UP (ref 2.4–4.7)
PLATELET # BLD AUTO: 104 K/UL — LOW (ref 150–400)
PLATELET # BLD AUTO: 118 K/UL — LOW (ref 150–400)
POTASSIUM SERPL-MCNC: 3.5 MMOL/L — SIGNIFICANT CHANGE UP (ref 3.5–5.3)
POTASSIUM SERPL-SCNC: 3.5 MMOL/L — SIGNIFICANT CHANGE UP (ref 3.5–5.3)
RBC # BLD: 2.81 M/UL — LOW (ref 4.6–6.2)
RBC # BLD: 2.91 M/UL — LOW (ref 4.6–6.2)
RBC # FLD: 13.3 % — SIGNIFICANT CHANGE UP (ref 11–15.6)
RBC # FLD: 13.6 % — SIGNIFICANT CHANGE UP (ref 11–15.6)
SODIUM SERPL-SCNC: 134 MMOL/L — LOW (ref 135–145)
TYPE + AB SCN PNL BLD: SIGNIFICANT CHANGE UP
WBC # BLD: 10 K/UL — SIGNIFICANT CHANGE UP (ref 4.8–10.8)
WBC # BLD: 6.8 K/UL — SIGNIFICANT CHANGE UP (ref 4.8–10.8)
WBC # FLD AUTO: 10 K/UL — SIGNIFICANT CHANGE UP (ref 4.8–10.8)
WBC # FLD AUTO: 6.8 K/UL — SIGNIFICANT CHANGE UP (ref 4.8–10.8)

## 2019-01-26 RX ORDER — SENNA PLUS 8.6 MG/1
2 TABLET ORAL AT BEDTIME
Qty: 0 | Refills: 0 | Status: DISCONTINUED | OUTPATIENT
Start: 2019-01-26 | End: 2019-01-31

## 2019-01-26 RX ORDER — ONDANSETRON 8 MG/1
4 TABLET, FILM COATED ORAL DAILY
Qty: 0 | Refills: 0 | Status: DISCONTINUED | OUTPATIENT
Start: 2019-01-26 | End: 2019-01-31

## 2019-01-26 RX ORDER — ONDANSETRON 8 MG/1
4 TABLET, FILM COATED ORAL EVERY 6 HOURS
Qty: 0 | Refills: 0 | Status: DISCONTINUED | OUTPATIENT
Start: 2019-01-26 | End: 2019-01-28

## 2019-01-26 RX ORDER — MAGNESIUM HYDROXIDE 400 MG/1
30 TABLET, CHEWABLE ORAL DAILY
Qty: 0 | Refills: 0 | Status: DISCONTINUED | OUTPATIENT
Start: 2019-01-26 | End: 2019-01-31

## 2019-01-26 RX ORDER — POLYETHYLENE GLYCOL 3350 17 G/17G
17 POWDER, FOR SOLUTION ORAL DAILY
Qty: 0 | Refills: 0 | Status: DISCONTINUED | OUTPATIENT
Start: 2019-01-26 | End: 2019-01-31

## 2019-01-26 RX ORDER — DOCUSATE SODIUM 100 MG
100 CAPSULE ORAL THREE TIMES A DAY
Qty: 0 | Refills: 0 | Status: DISCONTINUED | OUTPATIENT
Start: 2019-01-26 | End: 2019-01-31

## 2019-01-26 RX ADMIN — GABAPENTIN 300 MILLIGRAM(S): 400 CAPSULE ORAL at 19:38

## 2019-01-26 RX ADMIN — HYDROMORPHONE HYDROCHLORIDE 1 MILLIGRAM(S): 2 INJECTION INTRAMUSCULAR; INTRAVENOUS; SUBCUTANEOUS at 14:05

## 2019-01-26 RX ADMIN — GABAPENTIN 300 MILLIGRAM(S): 400 CAPSULE ORAL at 11:42

## 2019-01-26 RX ADMIN — ONDANSETRON 4 MILLIGRAM(S): 8 TABLET, FILM COATED ORAL at 22:00

## 2019-01-26 RX ADMIN — Medication 650 MILLIGRAM(S): at 17:52

## 2019-01-26 RX ADMIN — Medication 100 MILLIGRAM(S): at 14:05

## 2019-01-26 RX ADMIN — Medication 650 MILLIGRAM(S): at 17:17

## 2019-01-26 RX ADMIN — HYDROMORPHONE HYDROCHLORIDE 1 MILLIGRAM(S): 2 INJECTION INTRAMUSCULAR; INTRAVENOUS; SUBCUTANEOUS at 10:21

## 2019-01-26 RX ADMIN — HYDROMORPHONE HYDROCHLORIDE 1 MILLIGRAM(S): 2 INJECTION INTRAMUSCULAR; INTRAVENOUS; SUBCUTANEOUS at 19:58

## 2019-01-26 RX ADMIN — LIDOCAINE 1 PATCH: 4 CREAM TOPICAL at 19:58

## 2019-01-26 RX ADMIN — CHLORHEXIDINE GLUCONATE 1 APPLICATION(S): 213 SOLUTION TOPICAL at 11:47

## 2019-01-26 RX ADMIN — LIDOCAINE 1 PATCH: 4 CREAM TOPICAL at 11:42

## 2019-01-26 RX ADMIN — MAGNESIUM HYDROXIDE 30 MILLILITER(S): 400 TABLET, CHEWABLE ORAL at 12:50

## 2019-01-26 RX ADMIN — GABAPENTIN 300 MILLIGRAM(S): 400 CAPSULE ORAL at 03:48

## 2019-01-26 RX ADMIN — Medication 100 MILLIGRAM(S): at 21:57

## 2019-01-26 RX ADMIN — HYDROMORPHONE HYDROCHLORIDE 1 MILLIGRAM(S): 2 INJECTION INTRAMUSCULAR; INTRAVENOUS; SUBCUTANEOUS at 20:13

## 2019-01-26 RX ADMIN — LIDOCAINE 1 PATCH: 4 CREAM TOPICAL at 23:08

## 2019-01-26 RX ADMIN — Medication 400 MILLIGRAM(S): at 02:24

## 2019-01-26 RX ADMIN — POLYETHYLENE GLYCOL 3350 17 GRAM(S): 17 POWDER, FOR SOLUTION ORAL at 12:50

## 2019-01-26 RX ADMIN — ENOXAPARIN SODIUM 30 MILLIGRAM(S): 100 INJECTION SUBCUTANEOUS at 11:42

## 2019-01-26 RX ADMIN — SENNA PLUS 2 TABLET(S): 8.6 TABLET ORAL at 21:57

## 2019-01-26 RX ADMIN — ENOXAPARIN SODIUM 30 MILLIGRAM(S): 100 INJECTION SUBCUTANEOUS at 23:05

## 2019-01-26 RX ADMIN — HYDROMORPHONE HYDROCHLORIDE 1 MILLIGRAM(S): 2 INJECTION INTRAMUSCULAR; INTRAVENOUS; SUBCUTANEOUS at 10:50

## 2019-01-26 RX ADMIN — Medication 650 MILLIGRAM(S): at 11:42

## 2019-01-26 RX ADMIN — Medication 650 MILLIGRAM(S): at 06:19

## 2019-01-26 RX ADMIN — HYDROMORPHONE HYDROCHLORIDE 1 MILLIGRAM(S): 2 INJECTION INTRAMUSCULAR; INTRAVENOUS; SUBCUTANEOUS at 14:35

## 2019-01-26 RX ADMIN — HYDROMORPHONE HYDROCHLORIDE 1 MILLIGRAM(S): 2 INJECTION INTRAMUSCULAR; INTRAVENOUS; SUBCUTANEOUS at 04:00

## 2019-01-26 RX ADMIN — HYDROMORPHONE HYDROCHLORIDE 1 MILLIGRAM(S): 2 INJECTION INTRAMUSCULAR; INTRAVENOUS; SUBCUTANEOUS at 03:45

## 2019-01-26 RX ADMIN — Medication 400 MILLIGRAM(S): at 01:24

## 2019-01-26 RX ADMIN — Medication 650 MILLIGRAM(S): at 12:00

## 2019-01-26 RX ADMIN — Medication 650 MILLIGRAM(S): at 07:19

## 2019-01-26 RX ADMIN — Medication 650 MILLIGRAM(S): at 23:05

## 2019-01-26 NOTE — PHYSICAL THERAPY INITIAL EVALUATION ADULT - GENERAL OBSERVATIONS, REHAB EVAL
Pt received seated bedside in chair with family present +PICC right UE Pt Agreeable to PT evaulation with family present

## 2019-01-26 NOTE — PROGRESS NOTE ADULT - SUBJECTIVE AND OBJECTIVE BOX
INTERVAL HPI/OVERNIGHT EVENTS: Overnight patient was a code sepsis on the floor for elevated HR and temp of 100.5.  Patient was given IVF and blood cultures were sent at that time.  Patient's heart rate improved with IVF and pain medication.  Patient states he had 7/10 right thigh pain over wound vac site at that time.  Patient's fever resolved with Tylenol and hasn't returned overnight.  Patient denies pain at this time, N/V/D, CP or SOB.  Patient does not report pain over incision sites or vac.  Vac held throughout shift.  Patient is eating, + flatus and +BM.         MEDICATIONS  (STANDING):  acetaminophen   Tablet .. 650 milliGRAM(s) Oral every 6 hours  chlorhexidine 2% Cloths 1 Application(s) Topical daily  docusate sodium 100 milliGRAM(s) Oral three times a day  enoxaparin Injectable 30 milliGRAM(s) SubCutaneous every 12 hours  gabapentin 300 milliGRAM(s) Oral every 8 hours  lidocaine   Patch 1 Patch Transdermal daily  magnesium hydroxide Suspension 30 milliLiter(s) Oral daily  polyethylene glycol 3350 17 Gram(s) Oral daily  senna 2 Tablet(s) Oral at bedtime    MEDICATIONS  (PRN):  HYDROmorphone  Injectable 1 milliGRAM(s) IV Push every 4 hours PRN Severe Pain (7 - 10)  HYDROmorphone  Injectable 0.5 milliGRAM(s) IV Push every 3 hours PRN Break through pain  ibuprofen  Tablet. 400 milliGRAM(s) Oral every 8 hours PRN Mild Pain (1 - 3)  oxyCODONE    IR 5 milliGRAM(s) Oral every 4 hours PRN Moderate Pain (4 - 6)      Vital Signs Last 24 Hrs  T(C): 36.8 (2019 10:50), Max: 38.7 (2019 19:15)  T(F): 98.2 (2019 10:50), Max: 101.6 (2019 19:15)  HR: 84 (2019 10:50) (77 - 108)  BP: 123/74 (2019 10:50) (101/56 - 147/77)  BP(mean): 76 (2019 16:00) (76 - 76)  RR: 18 (2019 10:50) (10 - 20)  SpO2: 98% (2019 10:50) (90% - 98%)    Physical Exam:  Cons: NAD, sitting upright in bed  Neurological:  No sensory/motor deficits  HEENT: PERRLA, EOMI, no drainage or redness  Respiratory: unlabored, CTAB  Cardiovascular: RRR  Gastrointestinal: Soft, non-tender, normal bowel sounds  Extremities: No peripheral edema, No cyanosis, clubbing   Vascular: Equal and normal pulses: 2+ peripheral pulses throughout  Musculoskeletal: No joint pain, swelling or deformity; no limitation of movement  Skin: Wound vac in place      I&O's Detail    2019 07:01  -  2019 07:00  --------------------------------------------------------  IN:    IV PiggyBack: 100 mL    lactated ringers.: 625 mL  Total IN: 725 mL    OUT:    Indwelling Catheter - Urethral: 600 mL    Voided: 515 mL  Total OUT: 1115 mL    Total NET: -390 mL          LABS:                        8.2    6.8   )-----------( 104      ( 2019 09:25 )             24.6     01-26    134<L>  |  100  |  8.0  ----------------------------<  126<H>  3.5   |  26.0  |  0.68    Ca    7.8<L>      2019 09:25  Phos  2.7       Mg     1.9           PT/INR - ( 2019 20:34 )   PT: 14.0 sec;   INR: 1.21 ratio         PTT - ( 2019 22:22 )  PTT:24.4 sec  Urinalysis Basic - ( 2019 22:23 )    Color: Yellow / Appearance: Clear / S.020 / pH: x  Gluc: x / Ketone: Negative  / Bili: Negative / Urobili: 1 mg/dL   Blood: x / Protein: 30 mg/dL / Nitrite: Negative   Leuk Esterase: Small / RBC: 6-10 /HPF / WBC 3-5   Sq Epi: x / Non Sq Epi: Few / Bacteria: x        RADIOLOGY & ADDITIONAL STUDIES:

## 2019-01-26 NOTE — PHYSICAL THERAPY INITIAL EVALUATION ADULT - PERTINENT HX OF CURRENT PROBLEM, REHAB EVAL
right sacral fracture and left pubic rami fracture after being pinned under heavy sheet of fiberglass at work

## 2019-01-26 NOTE — PHYSICAL THERAPY INITIAL EVALUATION ADULT - ACTIVE RANGE OF MOTION EXAMINATION, REHAB EVAL
kirit. upper extremity Active ROM was WNL (within normal limits)/right lower extremity  limited by pain/Left LE Active ROM was WFL (within functional limits)

## 2019-01-26 NOTE — PHYSICAL THERAPY INITIAL EVALUATION ADULT - ADDITIONAL COMMENTS
Pt lives with pregnant wife and son( 2y/o) in second floor apartment in a house. 4 steps then 16 steps to enter with railing. Pt reports he is unable to stay on first floor. Pt has no DME.

## 2019-01-26 NOTE — PROGRESS NOTE ADULT - ASSESSMENT
28y Male trauma patient presenting with L inferior pubic rami fx, sacral fx, and sacral hematoma(s/p pudendal artery embolization)    -Cont to monitor for fevers or leukoctyosis  -F/u Blood cults  -Ortho to change wound vac  -WBAT - PT/OT  -Cont reg diet  -dvt ppx

## 2019-01-27 LAB
ANION GAP SERPL CALC-SCNC: 8 MMOL/L — SIGNIFICANT CHANGE UP (ref 5–17)
BLD GP AB SCN SERPL QL: SIGNIFICANT CHANGE UP
BUN SERPL-MCNC: 9 MG/DL — SIGNIFICANT CHANGE UP (ref 8–20)
CALCIUM SERPL-MCNC: 8.6 MG/DL — SIGNIFICANT CHANGE UP (ref 8.6–10.2)
CHLORIDE SERPL-SCNC: 102 MMOL/L — SIGNIFICANT CHANGE UP (ref 98–107)
CO2 SERPL-SCNC: 28 MMOL/L — SIGNIFICANT CHANGE UP (ref 22–29)
CREAT SERPL-MCNC: 0.76 MG/DL — SIGNIFICANT CHANGE UP (ref 0.5–1.3)
GLUCOSE SERPL-MCNC: 102 MG/DL — SIGNIFICANT CHANGE UP (ref 70–115)
HCT VFR BLD CALC: 23.2 % — LOW (ref 42–52)
HCT VFR BLD CALC: 23.5 % — LOW (ref 42–52)
HGB BLD-MCNC: 7.7 G/DL — LOW (ref 14–18)
HGB BLD-MCNC: 7.9 G/DL — LOW (ref 14–18)
MAGNESIUM SERPL-MCNC: 2 MG/DL — SIGNIFICANT CHANGE UP (ref 1.6–2.6)
MCHC RBC-ENTMCNC: 29.4 PG — SIGNIFICANT CHANGE UP (ref 27–31)
MCHC RBC-ENTMCNC: 33.6 G/DL — SIGNIFICANT CHANGE UP (ref 32–36)
MCV RBC AUTO: 87.4 FL — SIGNIFICANT CHANGE UP (ref 80–94)
PHOSPHATE SERPL-MCNC: 2.9 MG/DL — SIGNIFICANT CHANGE UP (ref 2.4–4.7)
PLATELET # BLD AUTO: 112 K/UL — LOW (ref 150–400)
POTASSIUM SERPL-MCNC: 4.4 MMOL/L — SIGNIFICANT CHANGE UP (ref 3.5–5.3)
POTASSIUM SERPL-SCNC: 4.4 MMOL/L — SIGNIFICANT CHANGE UP (ref 3.5–5.3)
RBC # BLD: 2.69 M/UL — LOW (ref 4.6–6.2)
RBC # FLD: 13.7 % — SIGNIFICANT CHANGE UP (ref 11–15.6)
SODIUM SERPL-SCNC: 138 MMOL/L — SIGNIFICANT CHANGE UP (ref 135–145)
TYPE + AB SCN PNL BLD: SIGNIFICANT CHANGE UP
WBC # BLD: 7.6 K/UL — SIGNIFICANT CHANGE UP (ref 4.8–10.8)
WBC # FLD AUTO: 7.6 K/UL — SIGNIFICANT CHANGE UP (ref 4.8–10.8)

## 2019-01-27 RX ADMIN — LIDOCAINE 1 PATCH: 4 CREAM TOPICAL at 23:11

## 2019-01-27 RX ADMIN — HYDROMORPHONE HYDROCHLORIDE 1 MILLIGRAM(S): 2 INJECTION INTRAMUSCULAR; INTRAVENOUS; SUBCUTANEOUS at 19:24

## 2019-01-27 RX ADMIN — Medication 650 MILLIGRAM(S): at 23:21

## 2019-01-27 RX ADMIN — LIDOCAINE 1 PATCH: 4 CREAM TOPICAL at 19:53

## 2019-01-27 RX ADMIN — HYDROMORPHONE HYDROCHLORIDE 1 MILLIGRAM(S): 2 INJECTION INTRAMUSCULAR; INTRAVENOUS; SUBCUTANEOUS at 10:55

## 2019-01-27 RX ADMIN — MAGNESIUM HYDROXIDE 30 MILLILITER(S): 400 TABLET, CHEWABLE ORAL at 12:00

## 2019-01-27 RX ADMIN — POLYETHYLENE GLYCOL 3350 17 GRAM(S): 17 POWDER, FOR SOLUTION ORAL at 11:59

## 2019-01-27 RX ADMIN — HYDROMORPHONE HYDROCHLORIDE 0.5 MILLIGRAM(S): 2 INJECTION INTRAMUSCULAR; INTRAVENOUS; SUBCUTANEOUS at 06:58

## 2019-01-27 RX ADMIN — Medication 650 MILLIGRAM(S): at 06:10

## 2019-01-27 RX ADMIN — HYDROMORPHONE HYDROCHLORIDE 1 MILLIGRAM(S): 2 INJECTION INTRAMUSCULAR; INTRAVENOUS; SUBCUTANEOUS at 03:49

## 2019-01-27 RX ADMIN — ENOXAPARIN SODIUM 30 MILLIGRAM(S): 100 INJECTION SUBCUTANEOUS at 23:22

## 2019-01-27 RX ADMIN — GABAPENTIN 300 MILLIGRAM(S): 400 CAPSULE ORAL at 19:26

## 2019-01-27 RX ADMIN — ONDANSETRON 4 MILLIGRAM(S): 8 TABLET, FILM COATED ORAL at 12:00

## 2019-01-27 RX ADMIN — Medication 650 MILLIGRAM(S): at 05:10

## 2019-01-27 RX ADMIN — Medication 650 MILLIGRAM(S): at 11:59

## 2019-01-27 RX ADMIN — Medication 650 MILLIGRAM(S): at 17:15

## 2019-01-27 RX ADMIN — Medication 100 MILLIGRAM(S): at 12:00

## 2019-01-27 RX ADMIN — HYDROMORPHONE HYDROCHLORIDE 1 MILLIGRAM(S): 2 INJECTION INTRAMUSCULAR; INTRAVENOUS; SUBCUTANEOUS at 10:42

## 2019-01-27 RX ADMIN — HYDROMORPHONE HYDROCHLORIDE 0.5 MILLIGRAM(S): 2 INJECTION INTRAMUSCULAR; INTRAVENOUS; SUBCUTANEOUS at 07:13

## 2019-01-27 RX ADMIN — HYDROMORPHONE HYDROCHLORIDE 1 MILLIGRAM(S): 2 INJECTION INTRAMUSCULAR; INTRAVENOUS; SUBCUTANEOUS at 19:40

## 2019-01-27 RX ADMIN — GABAPENTIN 300 MILLIGRAM(S): 400 CAPSULE ORAL at 12:00

## 2019-01-27 RX ADMIN — HYDROMORPHONE HYDROCHLORIDE 1 MILLIGRAM(S): 2 INJECTION INTRAMUSCULAR; INTRAVENOUS; SUBCUTANEOUS at 04:04

## 2019-01-27 RX ADMIN — CHLORHEXIDINE GLUCONATE 1 APPLICATION(S): 213 SOLUTION TOPICAL at 12:00

## 2019-01-27 RX ADMIN — GABAPENTIN 300 MILLIGRAM(S): 400 CAPSULE ORAL at 03:50

## 2019-01-27 RX ADMIN — LIDOCAINE 1 PATCH: 4 CREAM TOPICAL at 11:59

## 2019-01-27 RX ADMIN — Medication 650 MILLIGRAM(S): at 12:04

## 2019-01-27 RX ADMIN — Medication 650 MILLIGRAM(S): at 00:05

## 2019-01-27 RX ADMIN — Medication 100 MILLIGRAM(S): at 05:10

## 2019-01-27 RX ADMIN — Medication 100 MILLIGRAM(S): at 23:21

## 2019-01-27 RX ADMIN — ENOXAPARIN SODIUM 30 MILLIGRAM(S): 100 INJECTION SUBCUTANEOUS at 12:00

## 2019-01-27 NOTE — PROGRESS NOTE ADULT - ASSESSMENT
28y Male trauma patient presenting with L inferior pubic rami fx, sacral fx, and sacral hematoma(s/p pudendal artery embolization)    -Cont to monitor for fevers or leukoctyosis  -Ortho to change wound vac  -WBAT - PT/OT  -Cont reg diet  -dvt ppx 28y Male trauma patient presenting with L inferior pubic rami fx, sacral fx, and sacral hematoma(s/p pudendal artery embolization)    -Cont to monitor for fevers or leukoctyosis  -ortho following  -WBAT - PT/OT  -Cont reg diet  -dvt ppx

## 2019-01-27 NOTE — PROGRESS NOTE ADULT - SUBJECTIVE AND OBJECTIVE BOX
Patient seen and examined sitting in chair. Comfortable, pain controlled. States got up with PT yesterday and walked to bathroom. States wants to walk again today. No orthopedic complaints at this time.    Vital Signs Last 24 Hrs  T(C): 36.8 (27 Jan 2019 11:21), Max: 37.7 (26 Jan 2019 21:03)  T(F): 98.3 (27 Jan 2019 11:21), Max: 99.8 (26 Jan 2019 21:03)  HR: 89 (27 Jan 2019 11:21) (89 - 97)  BP: 116/62 (27 Jan 2019 11:21) (116/62 - 136/72)  BP(mean): --  RR: 18 (27 Jan 2019 11:21) (18 - 18)  SpO2: 93% (27 Jan 2019 04:41) (93% - 94%)    LE: SILT B/L. + strong dorsi/plantarflexion B/L. ext warm cap refill brisk B/L. calf soft NT B/L.                          7.9    7.6   )-----------( 112      ( 27 Jan 2019 07:33 )             23.5     A/P: 28 y.o M with right sacral fx, left inf pubic rami fx  - TTWB RLE, WBAT LLE  - DVTP  - H/H management as per primary team  - [facility] care primary team

## 2019-01-27 NOTE — PROGRESS NOTE ADULT - SUBJECTIVE AND OBJECTIVE BOX
HPI/OVERNIGHT EVENTS:  No acute events overnight. Patient is able to walk to the bathroom and back and needs assistance from the nurse. Otherwise, patient remained afebrile and vital signs stable. Complaining of pain that is moderately controlled with pain meds and of some bruising to his R testicle and R    MEDICATIONS  (STANDING):  acetaminophen   Tablet .. 650 milliGRAM(s) Oral every 6 hours  chlorhexidine 2% Cloths 1 Application(s) Topical daily  docusate sodium 100 milliGRAM(s) Oral three times a day  enoxaparin Injectable 30 milliGRAM(s) SubCutaneous every 12 hours  gabapentin 300 milliGRAM(s) Oral every 8 hours  lidocaine   Patch 1 Patch Transdermal daily  magnesium hydroxide Suspension 30 milliLiter(s) Oral daily  ondansetron    Tablet 4 milliGRAM(s) Oral daily  polyethylene glycol 3350 17 Gram(s) Oral daily  senna 2 Tablet(s) Oral at bedtime    MEDICATIONS  (PRN):  HYDROmorphone  Injectable 1 milliGRAM(s) IV Push every 4 hours PRN Severe Pain (7 - 10)  HYDROmorphone  Injectable 0.5 milliGRAM(s) IV Push every 3 hours PRN Break through pain  ibuprofen  Tablet. 400 milliGRAM(s) Oral every 8 hours PRN Mild Pain (1 - 3)  ondansetron Injectable 4 milliGRAM(s) IV Push every 6 hours PRN Nausea and/or Vomiting  oxyCODONE    IR 5 milliGRAM(s) Oral every 4 hours PRN Moderate Pain (4 - 6)      Vital Signs Last 24 Hrs  T(C): 37.8 (2019 15:51), Max: 37.8 (2019 15:51)  T(F): 100 (2019 15:51), Max: 100 (2019 15:51)  HR: 87 (2019 15:51) (87 - 97)  BP: 129/64 (2019 15:51) (116/62 - 136/72)  BP(mean): --  RR: 18 (2019 15:51) (17 - 18)  SpO2: 99% (2019 15:51) (93% - 99%)    Cons: NAD, sitting upright in bed  Neurological:  No sensory/motor deficits  HEENT: PERRLA, EOMI, no drainage or redness  Respiratory: unlabored, CTAB  Cardiovascular: RRR  Gastrointestinal: Soft, non-tender, normal bowel sounds  groin: R testicle ecchymosis and R lateral penile ecchymosis - soft  Extremities: No peripheral edema, No cyanosis, clubbing   Vascular: Equal and normal pulses: 2+ peripheral pulses throughout  Musculoskeletal: No joint pain, swelling or deformity; no limitation of movement  Skin: Wound vac in place      I&O's Detail    2019 07:  -  2019 07:00  --------------------------------------------------------  IN:    Oral Fluid: 850 mL  Total IN: 850 mL    OUT:    Voided: 570 mL  Total OUT: 570 mL    Total NET: 280 mL      2019 07:  -  2019 19:31  --------------------------------------------------------  IN:    Oral Fluid: 900 mL  Total IN: 900 mL    OUT:    Voided: 800 mL  Total OUT: 800 mL    Total NET: 100 mL          LABS:                        7.7    x     )-----------( x        ( 2019 17:40 )             23.2         138  |  102  |  9.0  ----------------------------<  102  4.4   |  28.0  |  0.76    Ca    8.6      2019 07:33  Phos  2.9       Mg     2.0           PT/INR - ( 2019 20:34 )   PT: 14.0 sec;   INR: 1.21 ratio           Urinalysis Basic - ( 2019 22:23 )    Color: Yellow / Appearance: Clear / S.020 / pH: x  Gluc: x / Ketone: Negative  / Bili: Negative / Urobili: 1 mg/dL   Blood: x / Protein: 30 mg/dL / Nitrite: Negative   Leuk Esterase: Small / RBC: 6-10 /HPF / WBC 3-5   Sq Epi: x / Non Sq Epi: Few / Bacteria: x

## 2019-01-28 LAB
-  AMIKACIN: SIGNIFICANT CHANGE UP
-  AMPICILLIN/SULBACTAM: SIGNIFICANT CHANGE UP
-  AMPICILLIN: SIGNIFICANT CHANGE UP
-  AZTREONAM: SIGNIFICANT CHANGE UP
-  CEFAZOLIN: SIGNIFICANT CHANGE UP
-  CEFEPIME: SIGNIFICANT CHANGE UP
-  CEFOXITIN: SIGNIFICANT CHANGE UP
-  CEFTRIAXONE: SIGNIFICANT CHANGE UP
-  CIPROFLOXACIN: SIGNIFICANT CHANGE UP
-  ERTAPENEM: SIGNIFICANT CHANGE UP
-  GENTAMICIN: SIGNIFICANT CHANGE UP
-  IMIPENEM: SIGNIFICANT CHANGE UP
-  LEVOFLOXACIN: SIGNIFICANT CHANGE UP
-  MEROPENEM: SIGNIFICANT CHANGE UP
-  NITROFURANTOIN: SIGNIFICANT CHANGE UP
-  PIPERACILLIN/TAZOBACTAM: SIGNIFICANT CHANGE UP
-  TIGECYCLINE: SIGNIFICANT CHANGE UP
-  TOBRAMYCIN: SIGNIFICANT CHANGE UP
-  TRIMETHOPRIM/SULFAMETHOXAZOLE: SIGNIFICANT CHANGE UP
ANION GAP SERPL CALC-SCNC: 9 MMOL/L — SIGNIFICANT CHANGE UP (ref 5–17)
BASOPHILS # BLD AUTO: 0 K/UL — SIGNIFICANT CHANGE UP (ref 0–0.2)
BASOPHILS NFR BLD AUTO: 0.5 % — SIGNIFICANT CHANGE UP (ref 0–2)
BUN SERPL-MCNC: 9 MG/DL — SIGNIFICANT CHANGE UP (ref 8–20)
CALCIUM SERPL-MCNC: 8.4 MG/DL — LOW (ref 8.6–10.2)
CHLORIDE SERPL-SCNC: 102 MMOL/L — SIGNIFICANT CHANGE UP (ref 98–107)
CO2 SERPL-SCNC: 26 MMOL/L — SIGNIFICANT CHANGE UP (ref 22–29)
CREAT SERPL-MCNC: 0.63 MG/DL — SIGNIFICANT CHANGE UP (ref 0.5–1.3)
CULTURE RESULTS: SIGNIFICANT CHANGE UP
EOSINOPHIL # BLD AUTO: 0.3 K/UL — SIGNIFICANT CHANGE UP (ref 0–0.5)
EOSINOPHIL NFR BLD AUTO: 3.9 % — SIGNIFICANT CHANGE UP (ref 0–6)
GLUCOSE SERPL-MCNC: 101 MG/DL — SIGNIFICANT CHANGE UP (ref 70–115)
HCT VFR BLD CALC: 23.3 % — LOW (ref 42–52)
HGB BLD-MCNC: 7.7 G/DL — LOW (ref 14–18)
HYPOCHROMIA BLD QL: SLIGHT — SIGNIFICANT CHANGE UP
LYMPHOCYTES # BLD AUTO: 1.5 K/UL — SIGNIFICANT CHANGE UP (ref 1–4.8)
LYMPHOCYTES # BLD AUTO: 19.4 % — LOW (ref 20–55)
MACROCYTES BLD QL: SLIGHT — SIGNIFICANT CHANGE UP
MAGNESIUM SERPL-MCNC: 2.1 MG/DL — SIGNIFICANT CHANGE UP (ref 1.6–2.6)
MCHC RBC-ENTMCNC: 29.2 PG — SIGNIFICANT CHANGE UP (ref 27–31)
MCHC RBC-ENTMCNC: 33 G/DL — SIGNIFICANT CHANGE UP (ref 32–36)
MCV RBC AUTO: 88.3 FL — SIGNIFICANT CHANGE UP (ref 80–94)
METHOD TYPE: SIGNIFICANT CHANGE UP
MICROCYTES BLD QL: SLIGHT — SIGNIFICANT CHANGE UP
MONOCYTES # BLD AUTO: 0.7 K/UL — SIGNIFICANT CHANGE UP (ref 0–0.8)
MONOCYTES NFR BLD AUTO: 8.9 % — SIGNIFICANT CHANGE UP (ref 3–10)
NEUTROPHILS # BLD AUTO: 5.2 K/UL — SIGNIFICANT CHANGE UP (ref 1.8–8)
NEUTROPHILS NFR BLD AUTO: 67 % — SIGNIFICANT CHANGE UP (ref 37–73)
ORGANISM # SPEC MICROSCOPIC CNT: SIGNIFICANT CHANGE UP
ORGANISM # SPEC MICROSCOPIC CNT: SIGNIFICANT CHANGE UP
OVALOCYTES BLD QL SMEAR: SLIGHT — SIGNIFICANT CHANGE UP
PHOSPHATE SERPL-MCNC: 3.4 MG/DL — SIGNIFICANT CHANGE UP (ref 2.4–4.7)
PLAT MORPH BLD: NORMAL — SIGNIFICANT CHANGE UP
PLATELET # BLD AUTO: 135 K/UL — LOW (ref 150–400)
POTASSIUM SERPL-MCNC: 4.2 MMOL/L — SIGNIFICANT CHANGE UP (ref 3.5–5.3)
POTASSIUM SERPL-SCNC: 4.2 MMOL/L — SIGNIFICANT CHANGE UP (ref 3.5–5.3)
RBC # BLD: 2.64 M/UL — LOW (ref 4.6–6.2)
RBC # FLD: 13.3 % — SIGNIFICANT CHANGE UP (ref 11–15.6)
RBC BLD AUTO: ABNORMAL
SODIUM SERPL-SCNC: 137 MMOL/L — SIGNIFICANT CHANGE UP (ref 135–145)
SPECIMEN SOURCE: SIGNIFICANT CHANGE UP
WBC # BLD: 7.7 K/UL — SIGNIFICANT CHANGE UP (ref 4.8–10.8)
WBC # FLD AUTO: 7.7 K/UL — SIGNIFICANT CHANGE UP (ref 4.8–10.8)

## 2019-01-28 PROCEDURE — 99231 SBSQ HOSP IP/OBS SF/LOW 25: CPT

## 2019-01-28 PROCEDURE — 99222 1ST HOSP IP/OBS MODERATE 55: CPT

## 2019-01-28 RX ADMIN — Medication 650 MILLIGRAM(S): at 13:28

## 2019-01-28 RX ADMIN — HYDROMORPHONE HYDROCHLORIDE 1 MILLIGRAM(S): 2 INJECTION INTRAMUSCULAR; INTRAVENOUS; SUBCUTANEOUS at 06:36

## 2019-01-28 RX ADMIN — CHLORHEXIDINE GLUCONATE 1 APPLICATION(S): 213 SOLUTION TOPICAL at 13:27

## 2019-01-28 RX ADMIN — Medication 650 MILLIGRAM(S): at 00:21

## 2019-01-28 RX ADMIN — Medication 650 MILLIGRAM(S): at 06:00

## 2019-01-28 RX ADMIN — HYDROMORPHONE HYDROCHLORIDE 1 MILLIGRAM(S): 2 INJECTION INTRAMUSCULAR; INTRAVENOUS; SUBCUTANEOUS at 20:45

## 2019-01-28 RX ADMIN — HYDROMORPHONE HYDROCHLORIDE 1 MILLIGRAM(S): 2 INJECTION INTRAMUSCULAR; INTRAVENOUS; SUBCUTANEOUS at 20:20

## 2019-01-28 RX ADMIN — ENOXAPARIN SODIUM 30 MILLIGRAM(S): 100 INJECTION SUBCUTANEOUS at 13:28

## 2019-01-28 RX ADMIN — HYDROMORPHONE HYDROCHLORIDE 1 MILLIGRAM(S): 2 INJECTION INTRAMUSCULAR; INTRAVENOUS; SUBCUTANEOUS at 07:00

## 2019-01-28 RX ADMIN — POLYETHYLENE GLYCOL 3350 17 GRAM(S): 17 POWDER, FOR SOLUTION ORAL at 13:27

## 2019-01-28 RX ADMIN — GABAPENTIN 300 MILLIGRAM(S): 400 CAPSULE ORAL at 20:21

## 2019-01-28 RX ADMIN — GABAPENTIN 300 MILLIGRAM(S): 400 CAPSULE ORAL at 05:59

## 2019-01-28 RX ADMIN — HYDROMORPHONE HYDROCHLORIDE 1 MILLIGRAM(S): 2 INJECTION INTRAMUSCULAR; INTRAVENOUS; SUBCUTANEOUS at 11:30

## 2019-01-28 RX ADMIN — MAGNESIUM HYDROXIDE 30 MILLILITER(S): 400 TABLET, CHEWABLE ORAL at 13:31

## 2019-01-28 RX ADMIN — ENOXAPARIN SODIUM 30 MILLIGRAM(S): 100 INJECTION SUBCUTANEOUS at 21:44

## 2019-01-28 RX ADMIN — GABAPENTIN 300 MILLIGRAM(S): 400 CAPSULE ORAL at 13:28

## 2019-01-28 RX ADMIN — Medication 650 MILLIGRAM(S): at 06:38

## 2019-01-28 RX ADMIN — HYDROMORPHONE HYDROCHLORIDE 1 MILLIGRAM(S): 2 INJECTION INTRAMUSCULAR; INTRAVENOUS; SUBCUTANEOUS at 02:40

## 2019-01-28 RX ADMIN — HYDROMORPHONE HYDROCHLORIDE 1 MILLIGRAM(S): 2 INJECTION INTRAMUSCULAR; INTRAVENOUS; SUBCUTANEOUS at 02:55

## 2019-01-28 RX ADMIN — ONDANSETRON 4 MILLIGRAM(S): 8 TABLET, FILM COATED ORAL at 13:28

## 2019-01-28 RX ADMIN — HYDROMORPHONE HYDROCHLORIDE 1 MILLIGRAM(S): 2 INJECTION INTRAMUSCULAR; INTRAVENOUS; SUBCUTANEOUS at 15:45

## 2019-01-28 RX ADMIN — Medication 100 MILLIGRAM(S): at 13:28

## 2019-01-28 RX ADMIN — HYDROMORPHONE HYDROCHLORIDE 1 MILLIGRAM(S): 2 INJECTION INTRAMUSCULAR; INTRAVENOUS; SUBCUTANEOUS at 15:27

## 2019-01-28 RX ADMIN — Medication 100 MILLIGRAM(S): at 06:00

## 2019-01-28 RX ADMIN — HYDROMORPHONE HYDROCHLORIDE 1 MILLIGRAM(S): 2 INJECTION INTRAMUSCULAR; INTRAVENOUS; SUBCUTANEOUS at 10:35

## 2019-01-28 RX ADMIN — Medication 650 MILLIGRAM(S): at 14:50

## 2019-01-28 RX ADMIN — Medication 650 MILLIGRAM(S): at 18:33

## 2019-01-28 RX ADMIN — Medication 650 MILLIGRAM(S): at 19:10

## 2019-01-28 NOTE — PROGRESS NOTE ADULT - ASSESSMENT
28y Male trauma patient presenting with L inferior pubic rami fx, sacral fx, and sacral hematoma(s/p pudendal artery embolization)    -pain control  -ortho following  -WBAT - PT/OT  -PMR- Acute rehab dispo  -Cont reg diet  -dvt ppx

## 2019-01-28 NOTE — OCCUPATIONAL THERAPY INITIAL EVALUATION ADULT - LIVES WITH, PROFILE
children/spouse/3 year old son, in a private house with 4 steps to enter and railing and then 16 steps with railing

## 2019-01-28 NOTE — PROGRESS NOTE ADULT - SUBJECTIVE AND OBJECTIVE BOX
Pt Name: WM LOPEZ    MRN: [id number]      Patient being followed for Right sacral fx with minimal displacement and left pubic rami fx. He was ambulating with walker yesterday and did well. He does report of moderate pelvic area pain. No other sites of pain reported.       PAST MEDICAL & SURGICAL HISTORY:  No pertinent past medical history  No significant past surgical history      Allergies: Allergy Status Unknown                              7.7    7.7   )-----------( 135      ( 28 Jan 2019 07:16 )             23.3       01-28    137  |  102  |  9.0  ----------------------------<  101  4.2   |  26.0  |  0.63    Ca    8.4<L>      28 Jan 2019 07:16  Phos  3.4     01-28  Mg     2.1     01-28            Medications: acetaminophen   Tablet .. 650 milliGRAM(s) Oral every 6 hours  chlorhexidine 2% Cloths 1 Application(s) Topical daily  docusate sodium 100 milliGRAM(s) Oral three times a day  enoxaparin Injectable 30 milliGRAM(s) SubCutaneous every 12 hours  gabapentin 300 milliGRAM(s) Oral every 8 hours  HYDROmorphone  Injectable 1 milliGRAM(s) IV Push every 4 hours PRN  HYDROmorphone  Injectable 0.5 milliGRAM(s) IV Push every 3 hours PRN  ibuprofen  Tablet. 400 milliGRAM(s) Oral every 8 hours PRN  lidocaine   Patch 1 Patch Transdermal daily  magnesium hydroxide Suspension 30 milliLiter(s) Oral daily  ondansetron    Tablet 4 milliGRAM(s) Oral daily  ondansetron Injectable 4 milliGRAM(s) IV Push every 6 hours PRN  oxyCODONE    IR 5 milliGRAM(s) Oral every 4 hours PRN  polyethylene glycol 3350 17 Gram(s) Oral daily  senna 2 Tablet(s) Oral at bedtime        Ambulation: Walking independently [ ] With Cane [ ] With Walker [ ]  Bedbound [ ]                           7.7    7.7   )-----------( 135      ( 28 Jan 2019 07:16 )             23.3     01-28    137  |  102  |  9.0  ----------------------------<  101  4.2   |  26.0  |  0.63    Ca    8.4<L>      28 Jan 2019 07:16  Phos  3.4     01-28  Mg     2.1     01-28        PHYSICAL EXAM:    Vital Signs Last 24 Hrs  T(C): 36.9 (28 Jan 2019 04:38), Max: 37.8 (27 Jan 2019 15:51)  T(F): 98.4 (28 Jan 2019 04:38), Max: 100 (27 Jan 2019 15:51)  HR: 91 (28 Jan 2019 04:38) (87 - 91)  BP: 127/71 (28 Jan 2019 04:38) (116/62 - 131/71)  BP(mean): --  RR: 18 (28 Jan 2019 04:38) (17 - 18)  SpO2: 95% (28 Jan 2019 04:38) (94% - 99%)  Daily     Daily       PHYSICAL EXAM:    Appearance: Alert, responsive, in no acute distress.    Neurological: Sensation is grossly intact to light touch. 5/5 motor function of all extremities. No focal deficits or weaknesses found.    Skin: no rash on visible skin. Skin is clean, dry and intact. No bleeding. No abrasions. No ulcerations.    Vascular: 2+ distal pulses. Cap refill < 2 sec. No signs of venous insufficiency or stasis. No extremity ulcerations. No cyanosis.    Musculoskeletal:  No extremity tenderness. Pelvis stable. No calf tenderness. + good PROM of the hips, knees and ankles. No crepitus.       A/P: 28yMale  Right sacral fx and left pubic rami fx with post-op anemia that has stabilized.    - Stable  - Pain Control  - DVT ppx: Lovenox  - Weight bearing status: RLE-  TTWB with crutches or walker and LLE- WBAT  - Acute rehab when bed available  - office follow-up in 2 weeks

## 2019-01-28 NOTE — CONSULT NOTE ADULT - SUBJECTIVE AND OBJECTIVE BOX
28yM was admitted on 01-24 after he was pinned under a heavy piece of fiberglass at work. He had no head injury but needed extrication by others. In ED, GCS=15 with back pain.       Imaging showed:  HEAD CT - No acute findings  CAP CT+ - ACTIVE ARTERIAL CONTRAST EXTRAVASATION INTO THE LEFT SPACE OF RETZIUS DISPLACING BLADDER AND PROSTATE. FRACTURE LEFT INFERIOR PUBIC RAMUS AND THE RIGHT SACRUM AS DESCRIBED. Bone spicules within the RIGHT S1-S2 foramen. Presacral hematoma without active extravasation . Remaining chest, abdomen and pelvic visceral anatomy unremarkable.  C SPINE CT - No acute findings  L SPINE CT - Acute comminuted displaced fracture of the right hemisacrum and sacral ala involving the articulating facets, extending into the sacral foraminal and right sacroiliac joint.    He is s/p wound vac placement. He is s/p embolization of the pudendal artery. Course complicated by fever and anemia. UA is positive, but now afebrile.     Patient is complaining of significant pain in the right pelvis but given the pain in the left, when he bears weight on the left it significantly hurts.      REVIEW OF SYSTEMS  Constitutional - No fever, No weight loss, No fatigue  HEENT - No eye pain, No visual disturbances, No difficulty hearing, No tinnitus, No vertigo, No neck pain  Respiratory - No cough, No wheezing, No shortness of breath  Cardiovascular - No chest pain, No palpitations  Gastrointestinal - No abdominal pain, No nausea, No vomiting, No diarrhea, No constipation  Genitourinary - No dysuria, No frequency, No hematuria, No incontinence  Neurological - No headaches, No memory loss, +loss of strength, No numbness, No tremors  Skin - No itching, No rashes, +lesions   Endocrine - No temperature intolerance  Musculoskeletal - +joint pain, +joint swelling, +muscle pain  Psychiatric - No depression, No anxiety    VITALS  T(C): 36.9 (01-28-19 @ 04:38), Max: 37.8 (01-27-19 @ 15:51)  HR: 91 (01-28-19 @ 04:38) (87 - 91)  BP: 127/71 (01-28-19 @ 04:38) (116/62 - 131/71)  RR: 18 (01-28-19 @ 04:38) (17 - 18)  SpO2: 95% (01-28-19 @ 04:38) (94% - 99%)  Wt(kg): --    PAST MEDICAL & SURGICAL HISTORY  No pertinent past medical history  No significant past surgical history      SOCIAL HISTORY  Smoking - Denied  EtOH - Denied   Drugs - Denied    FUNCTIONAL HISTORY  Lives with family, 4 REAGAN, 16 inside  Independent    CURRENT FUNCTIONAL STATUS  1/26  Bed Mobility: Sit to Supine:     · Level of [place]	moderate assist (50% patients effort)	  · Physical Assist/Nonphysical Assist	1 person assist	    Bed Mobility: Supine to Sit:     · Level of [place]	moderate assist (50% patients effort)	  · Physical Assist/Nonphysical Assist	1 person assist	    Bed Mobility Analysis:     · Bed Mobility Limitations	decreased ability to use legs for bridging/pushing; Right LE TTWB	  · Impairments Contributing to Impaired Bed Mobility	pain; decreased strength	    Transfer: Bed to Chair:     Transfer Skill: Bed to Chair   · Level of [place]	minimum assist (75% patients effort)	  · Weight-Bearing Restrictions	touch down weight-bearing; right LE	  · Assistive Device	rolling walker	    Transfer: Chair to Bed:     · Level of [place]	minimum assist (75% patients effort)	  · Physical Assist/Nonphysical Assist	1 person assist	  · Weight-Bearing Restrictions	touch down weight-bearing; right LE	  · Assistive Device	rolling walker	    Bed/Chair Transfer Safety Analysis:     · Impairments Contributing to Impaired Transfers	pain; decreased strength	  · Transfer Safety Concerns Noted	inability to maintain weight-bearing restrictions w/o assist; decreased weight-shifting ability	    Transfer: Sit to Stand:     · Level of [place]	moderate assist (50% patients effort)	  · Physical Assist/Nonphysical Assist	verbal cues; to adhere to right LE TTWB	  · Weight-Bearing Restrictions	toe touch weight-bearing	  · Assistive Device	rolling walker	    Transfer: Stand to Sit:     · Level of [place]	moderate assist (50% patients effort)	  · Weight-Bearing Restrictions	toe touch weight-bearing; right LE	  · Assistive Device	rolling walker	    Sit/Stand Transfer Safety Analysis:     · Transfer Safety Concerns Noted	inability to maintain weight-bearing restrictions w/o assist	  · Impairments Contributing to Impaired Transfers	pain; decreased strength	    Gait Skills:     · Level of [place]	minimum assist (75% patients effort)	  · Physical Assist/Nonphysical Assist	verbal cues	  · Weight-Bearing Restrictions	touch down weight-bearing; right LE	  · Assistive Device	rolling walker	  · Gait Distance	10 feet	    Gait Analysis:     · Gait Pattern Used	2-point gait	  · Gait Deviations Noted	decreased step length	  · Impairments Contributing to Gait Deviations	pain; decreased strength	    Stair Negotiation:     · Level of [place]	unable to perform; unsafe	      FAMILY HISTORY   [facility]    RECENT LABS/IMAGING  CBC Full  -  ( 28 Jan 2019 07:16 )  WBC Count : 7.7 K/uL  Hemoglobin : 7.7 g/dL  Hematocrit : 23.3 %  Platelet Count - Automated : 135 K/uL  Mean Cell Volume : 88.3 fl  Mean Cell Hemoglobin : 29.2 pg  Mean Cell Hemoglobin Concentration : 33.0 g/dL  Auto Neutrophil # : 5.2 K/uL  Auto Lymphocyte # : 1.5 K/uL  Auto Monocyte # : 0.7 K/uL  Auto Eosinophil # : 0.3 K/uL  Auto Basophil # : 0.0 K/uL  Auto Neutrophil % : 67.0 %  Auto Lymphocyte % : 19.4 %  Auto Monocyte % : 8.9 %  Auto Eosinophil % : 3.9 %  Auto Basophil % : 0.5 %    01-28    137  |  102  |  9.0  ----------------------------<  101  4.2   |  26.0  |  0.63    Ca    8.4<L>      28 Jan 2019 07:16  Phos  3.4     01-28  Mg     2.1     01-28          ALLERGIES  Allergy Status Unknown      MEDICATIONS   acetaminophen   Tablet .. 650 milliGRAM(s) Oral every 6 hours  chlorhexidine 2% Cloths 1 Application(s) Topical daily  docusate sodium 100 milliGRAM(s) Oral three times a day  enoxaparin Injectable 30 milliGRAM(s) SubCutaneous every 12 hours  gabapentin 300 milliGRAM(s) Oral every 8 hours  HYDROmorphone  Injectable 1 milliGRAM(s) IV Push every 4 hours PRN  HYDROmorphone  Injectable 0.5 milliGRAM(s) IV Push every 3 hours PRN  ibuprofen  Tablet. 400 milliGRAM(s) Oral every 8 hours PRN  lidocaine   Patch 1 Patch Transdermal daily  magnesium hydroxide Suspension 30 milliLiter(s) Oral daily  ondansetron    Tablet 4 milliGRAM(s) Oral daily  ondansetron Injectable 4 milliGRAM(s) IV Push every 6 hours PRN  oxyCODONE    IR 5 milliGRAM(s) Oral every 4 hours PRN  polyethylene glycol 3350 17 Gram(s) Oral daily  senna 2 Tablet(s) Oral at bedtime      ----------------------------------------------------------------------------------------  PHYSICAL EXAM  Constitutional - NAD, Comfortable  HEENT - NCAT, EOMI  Neck - Supple, No limited ROM  Chest - Breathing comfortably, No wheezing  Cardiovascular - S1S2   Abdomen - Soft   Extremities - No C/C/E, No calf tenderness   Neurologic Exam -                    Cognitive - Awake, Alert, AAO to self, place, date, year, situation     Communication - Fluent, No dysarthria     Motor - deficits limited to the BLE due to pain                    LEFT    UE - ShAB 5/5, EF 5/5, EE 5/5, WE 5/5,  5/5                    RIGHT UE - ShAB 5/5, EF 5/5, EE 5/5, WE 5/5,  5/5                    LEFT    LE - HF 3/5, KE 1/5, DF 5/5, PF 5/5                    RIGHT LE - HF 1/5, KE 1/5, DF 5/5, PF 5/5        Sensory - Intact to LT, Wound vac  Psychiatric - Mood stable, Affect WNL  ----------------------------------------------------------------------------------------  ASSESSMENT/PLAN  28yMale with functional deficits after a work related injury sustaining sacral and pelvic fractures.   Pain - Tylenol, Neurontin, Dilaudid, Ibuprofen, Oxycodone, Lidoderm	  DVT PPX - SCDs, Lovenox  Rehab - Will continue to follow for ongoing rehab needs and recommendations.  Recommend ACUTE inpatient rehabilitation for the functional deficits consisting of 3 hours of therapy/day & 24 hour RN/daily PMR physician for comorbid medical management. Patient will be able to tolerate 3 hours a day.    Continue bedside therapy as well as OOB throughout the day with mobilization by staff to maintain cardiopulmonary function and prevention of secondary complications related to debility.
Pt Name: [facility]    MRN: [id number]      Patient seen and examined at bedside. Patient c/o left sided pelvic pain, denies numbness/tingling. Patient states today at work, a "heavy piece of glass" fell on him. Patient states he is unsure of how it happened. Patient has no other orthopedic complaints.        REVIEW OF SYSTEMS      General:	denies fever/chills    Respiratory and Thorax: denies SOB  	  Cardiovascular:	denies CP    Gastrointestinal:	 denies abd pain    Musculoskeletal:	 c/o left sided pelvic pain    Neurological:	denies numbness/tingling	    ROS is otherwise negative.    PAST MEDICAL & SURGICAL HISTORY:  PAST MEDICAL & SURGICAL HISTORY:  No pertinent past medical history  No significant past surgical history      Allergies: Allergy Status Unknown      Medications: acetaminophen  IVPB .. 1000 milliGRAM(s) IV Intermittent once PRN  HYDROmorphone  Injectable 2 milliGRAM(s) IV Push every 4 hours PRN  lactated ringers. 1000 milliLiter(s) IV Continuous <Continuous>      FAMILY HISTORY:  : non-contributory    Social History:     Ambulation: Walking independently [X ] With Cane [ ] With Walker [ ]  Bedbound [ ]                           13.8   8.6   )-----------( 172      ( 24 Jan 2019 12:30 )             40.7     01-24    141  |  103  |  14.0  ----------------------------<  142<H>  3.6   |  25.0  |  0.92    Ca    8.8      24 Jan 2019 12:30    TPro  7.3  /  Alb  4.2  /  TBili  0.4  /  DBili  x   /  AST  32  /  ALT  33  /  AlkPhos  76  01-24      PHYSICAL EXAM:    Vital Signs Last 24 Hrs  T(C): --  T(F): --  HR: --  BP: --  BP(mean): --  RR: --  SpO2: --  Daily     Daily     Appearance: Alert, responsive, in no acute distress.    Neurological: Sensation is grossly intact to light touch.     Skin: no rash on visible skin. Skin is clean, dry and intact. No bleeding. No abrasions. No ulcerations.    Vascular: 2+ distal pulses. Cap refill < 2 sec. No signs of venous insuffiency or stasis. No extremity ulcerations. No cyanosis.    Musculoskeletal:      Back: Patient currently refusing back exam due to pain       Left Upper Extremity: can move freely without pain, no obvious deformity       Right Upper Extremity: can move freely without pain, no obvious deformity       Left Lower Extremity: Skin intact. + mild TTP left pelvic region. Patient unable to SLR. SILT. + dorsi/plantarflexion. +EHL/FHL. calf soft NT B/L       Right Lower Extremity: skin intact. no erythema. SILT. + dorsi/plantarflexion. ext warm cap refill brisk.    Imaging Studies:    < from: CT Abdomen and Pelvis w/ IV Cont (01.24.19 @ 12:59) >  EXAM:  CT ABDOMEN AND PELVIS IC                         EXAM:  CT CHEST IC                          *** ADDENDUM 01/24/2019  ***    Additional information.  There is focal blush of contrast in the region of the RIGHT spenser of the   penis displayed on axial image 162 series 11.      *** END OF ADDENDUM 01/24/2019  ***      PROCEDURE DATE:  01/24/2019          INTERPRETATION:  CT of the chest, abdomen and pelvis.   COMPARISON: None      CLINICAL INFORMATION: Heavy object fell on patient. Chest abdomen pelvic   pain. Assess for fracture, visceral injury.  PROCEDURE:   100 ml of Omnipaque was injected intravenously. None was discarded  2.5 mm axial slice thickness images were obtained. Coronal and sagittal   reformats were also obtained. Maximum intensity projection,(MIP), imaging   was created and interpreted.     FINDINGS:  THERE IS A FRACTURE OF THE LEFT INFERIOR PUBIC RAMUS WITH ACTIVE   EXTRAVASATION OF CONTRAST INTO THE SPACE OF RETZIUS WITH A HEMATOMA   DISPLACING THE BLADDER POSTERIORLY AND TO THE RIGHT. Internal obturator   muscle remains intact.  THERE ARE IS A COMPLEX FRACTURE OF THE RIGHT SACRUM WITH BONE FRAGMENT   WITHIN THE RIGHT S1-S2 SACRAL FORAMEN . SI joints remain intact . I there   is a RIGHT presacral hematoma@the fracture site without active contrast   extravasation.    The airway shows normal caliber and contour with patent lumen.    There are no focal airspace consolidations. The lung interstitium is   normal.     There is no pleural effusion or pneumothorax.    There are no mediastinal masses or lymphadenopathy.     The mediastinum great vessels are normal.     The heart is normal. There is no pericardial effusion.    There is no free intra-abdominal air or ascites.     The liver, spleen, pancreas, adrenal glands, gallbladder are normal.    There is no intra or extrahepatic biliary ductal dilatation.    The stomach, duodenum, small and large bowel and appendix are within   normal limits.    Both kidneys show normal uptake of contrast media without masses or   hydronephrosis.      The urinary bladder shows normal morphology and contour.      Prostate and seminal vesicles distorted by the use larger space of   Retzius hematoma..  No adenopathy..  Abdominal aorta and iliac arteries patent ..        IMPRESSION:   ACTIVE ARTERIAL CONTRAST EXTRAVASATION INTO THE LEFT SPACE OF RETZIUS   DISPLACING BLADDER AND PROSTATE.  FRACTURE LEFT INFERIOR PUBIC RAMUS AND THE RIGHT SACRUM AS DESCRIBED.  Bone spicules within the RIGHT S1-S2 foramen.  Presacral hematoma without active extravasation . Remaining chest,   abdomen and pelvic visceral anatomy unremarkable.      Critical value  discussed with Dr. Proctor, on 1/24/2019 at 1:00 PM   with read back.  Hospital policies for critical values including read back   policy were followed.  The verbal communication of the critical value   supplements this written report.           ***Please see the addendum at the top of this report. It may contain   additional important information or changes.****          KRIS METCALF M.D., ATTENDING RADIOLOGIST  This document has been electronically signed. Jan 24 2019  1:18PM  Addend:  KRIS METCALF M.D., ATTENDING RADIOLOGIST  This addendum was electronically signed on: Jan 24 2019  1:31PM.    < end of copied text >      A/P:  Pt is a  43y Male with left pubic rami/right sacrum fx    PLAN:   D/W Dr. Morrow  f/u pelvic inlet/outlet views  patient may WBAT with PT  if unable to tolerate, consider surgical intervention  will defer to [facility] for vascular concerns  continue care primary team

## 2019-01-28 NOTE — OCCUPATIONAL THERAPY INITIAL EVALUATION ADULT - MD ORDER
OT consult and treat O-T Advancement Flap Text: The defect edges were debeveled with a #15 scalpel blade.  Given the location of the defect, shape of the defect and the proximity to free margins an O-T advancement flap was deemed most appropriate.  Using a sterile surgical marker, an appropriate advancement flap was drawn incorporating the defect and placing the expected incisions within the relaxed skin tension lines where possible.    The area thus outlined was incised deep to adipose tissue with a #15 scalpel blade.  The skin margins were undermined to an appropriate distance in all directions utilizing iris scissors.

## 2019-01-28 NOTE — OCCUPATIONAL THERAPY INITIAL EVALUATION ADULT - PLANNED THERAPY INTERVENTIONS, OT EVAL
ADL retraining/balance training/bed mobility training/motor coordination training/ROM/strengthening/neuromuscular re-education/transfer training

## 2019-01-28 NOTE — PROGRESS NOTE ADULT - SUBJECTIVE AND OBJECTIVE BOX
HPI/OVERNIGHT EVENTS: Patient seen and examined at bedside this AM. No acute events overnight per nursing reports.. Pain well controlled on current regimen. Patient has full bowel function, voiding with no symptoms of dysuria or retention. Denies fever, chills, nausea, vomitting, chest pain, SOB, dizziness, abd pain or any other concerning symptoms    Vital Signs Last 24 Hrs  T(C): 36.8 (28 Jan 2019 11:56), Max: 37.8 (27 Jan 2019 15:51)  T(F): 98.3 (28 Jan 2019 11:56), Max: 100 (27 Jan 2019 15:51)  HR: 84 (28 Jan 2019 11:56) (84 - 91)  BP: 129/68 (28 Jan 2019 11:56) (127/71 - 131/71)  BP(mean): --  RR: 18 (28 Jan 2019 11:56) (18 - 18)  SpO2: 98% (28 Jan 2019 11:56) (94% - 99%)    I&O's Detail    27 Jan 2019 07:01  -  28 Jan 2019 07:00  --------------------------------------------------------  IN:    Oral Fluid: 900 mL  Total IN: 900 mL    OUT:    Voided: 1500 mL  Total OUT: 1500 mL    Total NET: -600 mL      28 Jan 2019 07:01  -  28 Jan 2019 13:34  --------------------------------------------------------  IN:    Oral Fluid: 120 mL  Total IN: 120 mL    OUT:  Total OUT: 0 mL    Total NET: 120 mL          Cons: NAD, sitting upright in bed  Neurological:  No sensory/motor deficits  HEENT: PERRLA, EOMI, no drainage or redness  Respiratory: unlabored, CTAB  Cardiovascular: RRR  Gastrointestinal: Soft, non-tender, normal bowel sounds  groin: R testicle ecchymosis and R lateral penile ecchymosis - soft  Extremities: No peripheral edema, No cyanosis, clubbing   Vascular: Equal and normal pulses: 2+ peripheral pulses throughout  Musculoskeletal: No joint pain, swelling or deformity; no limitation of movement    LABS:                        7.7    7.7   )-----------( 135      ( 28 Jan 2019 07:16 )             23.3     01-28    137  |  102  |  9.0  ----------------------------<  101  4.2   |  26.0  |  0.63    Ca    8.4<L>      28 Jan 2019 07:16  Phos  3.4     01-28  Mg     2.1     01-28            MEDICATIONS  (STANDING):  acetaminophen   Tablet .. 650 milliGRAM(s) Oral every 6 hours  chlorhexidine 2% Cloths 1 Application(s) Topical daily  docusate sodium 100 milliGRAM(s) Oral three times a day  enoxaparin Injectable 30 milliGRAM(s) SubCutaneous every 12 hours  gabapentin 300 milliGRAM(s) Oral every 8 hours  lidocaine   Patch 1 Patch Transdermal daily  magnesium hydroxide Suspension 30 milliLiter(s) Oral daily  ondansetron    Tablet 4 milliGRAM(s) Oral daily  polyethylene glycol 3350 17 Gram(s) Oral daily  senna 2 Tablet(s) Oral at bedtime    MEDICATIONS  (PRN):  HYDROmorphone  Injectable 1 milliGRAM(s) IV Push every 4 hours PRN Severe Pain (7 - 10)  HYDROmorphone  Injectable 0.5 milliGRAM(s) IV Push every 3 hours PRN Break through pain  ibuprofen  Tablet. 400 milliGRAM(s) Oral every 8 hours PRN Mild Pain (1 - 3)  ondansetron Injectable 4 milliGRAM(s) IV Push every 6 hours PRN Nausea and/or Vomiting  oxyCODONE    IR 5 milliGRAM(s) Oral every 4 hours PRN Moderate Pain (4 - 6)      MICRO:   Cultures     STUDIES:   EKG, CXR, U/S, CT, MRI

## 2019-01-28 NOTE — OCCUPATIONAL THERAPY INITIAL EVALUATION ADULT - DIAGNOSIS, OT EVAL
Right hemisacrum and sacral ala fx; Acute arterial contrast extravasation into left space of retzius displacing bladder and prostate; Left inferior pubic ramus fx

## 2019-01-29 LAB
HCT VFR BLD CALC: 24.5 % — LOW (ref 42–52)
HGB BLD-MCNC: 7.9 G/DL — LOW (ref 14–18)

## 2019-01-29 PROCEDURE — 99232 SBSQ HOSP IP/OBS MODERATE 35: CPT

## 2019-01-29 PROCEDURE — 99231 SBSQ HOSP IP/OBS SF/LOW 25: CPT

## 2019-01-29 RX ORDER — GABAPENTIN 400 MG/1
1 CAPSULE ORAL
Qty: 0 | Refills: 0 | COMMUNITY
Start: 2019-01-29

## 2019-01-29 RX ORDER — ENOXAPARIN SODIUM 100 MG/ML
40 INJECTION SUBCUTANEOUS
Qty: 0 | Refills: 0 | COMMUNITY
Start: 2019-01-29

## 2019-01-29 RX ORDER — ACETAMINOPHEN 500 MG
2 TABLET ORAL
Qty: 0 | Refills: 0 | COMMUNITY
Start: 2019-01-29

## 2019-01-29 RX ORDER — HYDROMORPHONE HYDROCHLORIDE 2 MG/ML
2 INJECTION INTRAMUSCULAR; INTRAVENOUS; SUBCUTANEOUS EVERY 4 HOURS
Qty: 0 | Refills: 0 | Status: DISCONTINUED | OUTPATIENT
Start: 2019-01-29 | End: 2019-01-31

## 2019-01-29 RX ORDER — OXYCODONE HYDROCHLORIDE 5 MG/1
10 TABLET ORAL EVERY 4 HOURS
Qty: 0 | Refills: 0 | Status: DISCONTINUED | OUTPATIENT
Start: 2019-01-29 | End: 2019-01-29

## 2019-01-29 RX ORDER — LIDOCAINE 4 G/100G
1 CREAM TOPICAL
Qty: 0 | Refills: 0 | COMMUNITY
Start: 2019-01-29

## 2019-01-29 RX ORDER — DOCUSATE SODIUM 100 MG
1 CAPSULE ORAL
Qty: 0 | Refills: 0 | COMMUNITY
Start: 2019-01-29

## 2019-01-29 RX ORDER — TRAMADOL HYDROCHLORIDE 50 MG/1
50 TABLET ORAL EVERY 6 HOURS
Qty: 0 | Refills: 0 | Status: DISCONTINUED | OUTPATIENT
Start: 2019-01-29 | End: 2019-01-31

## 2019-01-29 RX ORDER — OXYCODONE HYDROCHLORIDE 5 MG/1
1 TABLET ORAL
Qty: 0 | Refills: 0 | COMMUNITY
Start: 2019-01-29

## 2019-01-29 RX ORDER — POLYETHYLENE GLYCOL 3350 17 G/17G
17 POWDER, FOR SOLUTION ORAL
Qty: 0 | Refills: 0 | COMMUNITY
Start: 2019-01-29

## 2019-01-29 RX ORDER — MAGNESIUM HYDROXIDE 400 MG/1
30 TABLET, CHEWABLE ORAL
Qty: 0 | Refills: 0 | COMMUNITY
Start: 2019-01-29

## 2019-01-29 RX ORDER — IBUPROFEN 200 MG
1 TABLET ORAL
Qty: 0 | Refills: 0 | COMMUNITY
Start: 2019-01-29

## 2019-01-29 RX ORDER — SENNA PLUS 8.6 MG/1
2 TABLET ORAL
Qty: 0 | Refills: 0 | COMMUNITY
Start: 2019-01-29

## 2019-01-29 RX ADMIN — Medication 650 MILLIGRAM(S): at 05:14

## 2019-01-29 RX ADMIN — Medication 100 MILLIGRAM(S): at 22:41

## 2019-01-29 RX ADMIN — POLYETHYLENE GLYCOL 3350 17 GRAM(S): 17 POWDER, FOR SOLUTION ORAL at 14:08

## 2019-01-29 RX ADMIN — OXYCODONE HYDROCHLORIDE 10 MILLIGRAM(S): 5 TABLET ORAL at 10:15

## 2019-01-29 RX ADMIN — GABAPENTIN 300 MILLIGRAM(S): 400 CAPSULE ORAL at 20:11

## 2019-01-29 RX ADMIN — ENOXAPARIN SODIUM 30 MILLIGRAM(S): 100 INJECTION SUBCUTANEOUS at 14:08

## 2019-01-29 RX ADMIN — Medication 650 MILLIGRAM(S): at 05:35

## 2019-01-29 RX ADMIN — ONDANSETRON 4 MILLIGRAM(S): 8 TABLET, FILM COATED ORAL at 14:08

## 2019-01-29 RX ADMIN — TRAMADOL HYDROCHLORIDE 50 MILLIGRAM(S): 50 TABLET ORAL at 23:37

## 2019-01-29 RX ADMIN — Medication 650 MILLIGRAM(S): at 18:12

## 2019-01-29 RX ADMIN — MAGNESIUM HYDROXIDE 30 MILLILITER(S): 400 TABLET, CHEWABLE ORAL at 14:08

## 2019-01-29 RX ADMIN — OXYCODONE HYDROCHLORIDE 10 MILLIGRAM(S): 5 TABLET ORAL at 13:32

## 2019-01-29 RX ADMIN — Medication 650 MILLIGRAM(S): at 14:07

## 2019-01-29 RX ADMIN — Medication 100 MILLIGRAM(S): at 14:07

## 2019-01-29 RX ADMIN — HYDROMORPHONE HYDROCHLORIDE 1 MILLIGRAM(S): 2 INJECTION INTRAMUSCULAR; INTRAVENOUS; SUBCUTANEOUS at 01:25

## 2019-01-29 RX ADMIN — Medication 650 MILLIGRAM(S): at 19:03

## 2019-01-29 RX ADMIN — HYDROMORPHONE HYDROCHLORIDE 1 MILLIGRAM(S): 2 INJECTION INTRAMUSCULAR; INTRAVENOUS; SUBCUTANEOUS at 05:35

## 2019-01-29 RX ADMIN — SENNA PLUS 2 TABLET(S): 8.6 TABLET ORAL at 22:41

## 2019-01-29 RX ADMIN — Medication 650 MILLIGRAM(S): at 23:37

## 2019-01-29 RX ADMIN — OXYCODONE HYDROCHLORIDE 10 MILLIGRAM(S): 5 TABLET ORAL at 14:30

## 2019-01-29 RX ADMIN — HYDROMORPHONE HYDROCHLORIDE 1 MILLIGRAM(S): 2 INJECTION INTRAMUSCULAR; INTRAVENOUS; SUBCUTANEOUS at 00:56

## 2019-01-29 RX ADMIN — Medication 100 MILLIGRAM(S): at 05:14

## 2019-01-29 RX ADMIN — GABAPENTIN 300 MILLIGRAM(S): 400 CAPSULE ORAL at 14:07

## 2019-01-29 RX ADMIN — GABAPENTIN 300 MILLIGRAM(S): 400 CAPSULE ORAL at 05:14

## 2019-01-29 RX ADMIN — HYDROMORPHONE HYDROCHLORIDE 1 MILLIGRAM(S): 2 INJECTION INTRAMUSCULAR; INTRAVENOUS; SUBCUTANEOUS at 05:14

## 2019-01-29 RX ADMIN — OXYCODONE HYDROCHLORIDE 10 MILLIGRAM(S): 5 TABLET ORAL at 09:18

## 2019-01-29 RX ADMIN — Medication 650 MILLIGRAM(S): at 15:16

## 2019-01-29 RX ADMIN — ENOXAPARIN SODIUM 30 MILLIGRAM(S): 100 INJECTION SUBCUTANEOUS at 23:37

## 2019-01-29 NOTE — PROGRESS NOTE ADULT - SUBJECTIVE AND OBJECTIVE BOX
HPI/OVERNIGHT EVENTS: 27yo M s/p Plate fell on him w subsequent IR embolization of pudendal artery examined at bedside and found in no distress. No events overnight. Patient is tolerating diet, having BM, passing gas, and voiding. No active complaints. Pain is well tolerated with current regiment. Denies fever, chills, nausea, vomiting, chest pain, and SOB.    Vital Signs Last 24 Hrs  T(C): 37.1 (29 Jan 2019 04:40), Max: 37.1 (29 Jan 2019 04:40)  T(F): 98.7 (29 Jan 2019 04:40), Max: 98.7 (29 Jan 2019 04:40)  HR: 83 (29 Jan 2019 04:40) (83 - 88)  BP: 129/68 (29 Jan 2019 04:40) (128/70 - 136/72)  BP(mean): --  RR: 18 (29 Jan 2019 04:40) (18 - 18)  SpO2: 95% (29 Jan 2019 04:40) (95% - 98%)    I&O's Detail    28 Jan 2019 07:01  -  29 Jan 2019 07:00  --------------------------------------------------------  IN:    Oral Fluid: 560 mL  Total IN: 560 mL    OUT:    Voided: 300 mL  Total OUT: 300 mL    Total NET: 260 mL          Constitutional: patient resting comfortably in bed, in no acute distress  HEENT: EOMI / PERRL   Neck: No JVD, full ROM without pain   Respiratory: CTAB, respirations are unlabored, no accessory muscle use, no conversational dyspnea  Cardiovascular: regular rate & rhythm  Gastrointestinal: Abdomen soft, non-tender, non-distended, no rebound tenderness / guarding      LABS:                        7.9    x     )-----------( x        ( 29 Jan 2019 07:21 )             24.5     01-28    137  |  102  |  9.0  ----------------------------<  101  4.2   |  26.0  |  0.63    Ca    8.4<L>      28 Jan 2019 07:16  Phos  3.4     01-28  Mg     2.1     01-28            MEDICATIONS  (STANDING):  acetaminophen   Tablet .. 650 milliGRAM(s) Oral every 6 hours  docusate sodium 100 milliGRAM(s) Oral three times a day  enoxaparin Injectable 30 milliGRAM(s) SubCutaneous every 12 hours  gabapentin 300 milliGRAM(s) Oral every 8 hours  lidocaine   Patch 1 Patch Transdermal daily  magnesium hydroxide Suspension 30 milliLiter(s) Oral daily  ondansetron    Tablet 4 milliGRAM(s) Oral daily  polyethylene glycol 3350 17 Gram(s) Oral daily  senna 2 Tablet(s) Oral at bedtime    MEDICATIONS  (PRN):  ibuprofen  Tablet. 400 milliGRAM(s) Oral every 8 hours PRN Mild Pain (1 - 3)  oxyCODONE    IR 10 milliGRAM(s) Oral every 4 hours PRN Severe Pain (7 - 10)  oxyCODONE    IR 5 milliGRAM(s) Oral every 4 hours PRN Moderate Pain (4 - 6)

## 2019-01-29 NOTE — PROGRESS NOTE ADULT - SUBJECTIVE AND OBJECTIVE BOX
Patient in bed, pain is overall improved.  Can move the right leg more today.  Discussed plan for AR and is open and motivated.     FUNCTIONAL PROGRESS  1/28    Bed Mobility  Bed Mobility Training Sit-to-Supine: minimum assist (75% patient effort)  Bed Mobility Training Supine-to-Sit: minimum assist (75% patient effort)    Sit-Stand Transfer Training  Transfer Training Sit-to-Stand Transfer: minimum assist (75% patient effort);  1 person assist;  weight-bearing as tolerated   rolling walker;  L LE,   NWB R LE  Transfer Training Stand-to-Sit Transfer: minimum assist (75% patient effort);  weight-bearing as tolerated   L LE,   NWB R LE   rolling walker  Sit-to-Stand Transfer Training Transfer Safety Analysis: impaired balance;  pain;  rolling walker    Gait Training  Gait Training: minimum assist (75% patient effort);  1 person assist;  verbal cues;  weight-bearing as tolerated   L LE,   NWB R LE   rolling walker;  25 feet  Gait Analysis: 3-point gait   decreased step length;  impaired balance;  decreased strength;  decreased ROM;  25 feet;  rolling walker  Gait Number of Times:: x 1      REVIEW OF SYSTEMS  Constitutional - No fever,  No fatigue  HEENT - No vertigo, No neck pain  Neurological - No headaches, No memory loss, +loss of strength, No numbness, No tremors  Skin - No rashes, No lesions   Musculoskeletal - +joint pain, +joint swelling, +muscle pain  Psychiatric - No depression, No anxiety    VITALS  T(C): 37.1 (01-29-19 @ 04:40), Max: 37.1 (01-29-19 @ 04:40)  HR: 83 (01-29-19 @ 04:40) (83 - 88)  BP: 129/68 (01-29-19 @ 04:40) (128/70 - 136/72)  RR: 18 (01-29-19 @ 04:40) (18 - 18)  SpO2: 95% (01-29-19 @ 04:40) (95% - 98%)  Wt(kg): --    MEDICATIONS   acetaminophen   Tablet .. 650 milliGRAM(s) every 6 hours  docusate sodium 100 milliGRAM(s) three times a day  enoxaparin Injectable 30 milliGRAM(s) every 12 hours  gabapentin 300 milliGRAM(s) every 8 hours  ibuprofen  Tablet. 400 milliGRAM(s) every 8 hours PRN  lidocaine   Patch 1 Patch daily  magnesium hydroxide Suspension 30 milliLiter(s) daily  ondansetron    Tablet 4 milliGRAM(s) daily  oxyCODONE    IR 10 milliGRAM(s) every 4 hours PRN  oxyCODONE    IR 5 milliGRAM(s) every 4 hours PRN  polyethylene glycol 3350 17 Gram(s) daily  senna 2 Tablet(s) at bedtime      RECENT LABS/IMAGING  CBC Full  -  ( 29 Jan 2019 07:21 )  WBC Count : x  Hemoglobin : 7.9 g/dL  Hematocrit : 24.5 %  Platelet Count - Automated : x  Mean Cell Volume : x  Mean Cell Hemoglobin : x  Mean Cell Hemoglobin Concentration : x  Auto Neutrophil # : x  Auto Lymphocyte # : x  Auto Monocyte # : x  Auto Eosinophil # : x  Auto Basophil # : x  Auto Neutrophil % : x  Auto Lymphocyte % : x  Auto Monocyte % : x  Auto Eosinophil % : x  Auto Basophil % : x    01-28    137  |  102  |  9.0  ----------------------------<  101  4.2   |  26.0  |  0.63    Ca    8.4<L>      28 Jan 2019 07:16  Phos  3.4     01-28  Mg     2.1     01-28        ----------------------------------------------------------------------------------------  PHYSICAL EXAM  Constitutional - NAD, Comfortable  Extremities - No C/C/E, No calf tenderness   Neurologic Exam -                    Motor - deficits limited to the BLE due to pain                    LEFT    UE - ShAB 5/5, EF 5/5, EE 5/5, WE 5/5,  5/5                    RIGHT UE - ShAB 5/5, EF 5/5, EE 5/5, WE 5/5,  5/5                    LEFT    LE - HF 3/5, KE 1/5, DF 5/5, PF 5/5                    RIGHT LE - HF 2/5, KE 1/5, DF 5/5, PF 5/5        Sensory - Intact to LT   Psychiatric - Mood stable, Affect WNL  ----------------------------------------------------------------------------------------  ASSESSMENT/PLAN  28yMale with functional deficits after a work related injury sustaining sacral and pelvic fractures.   Pain - Tylenol, Neurontin, Ibuprofen, Oxycodone, Lidoderm	  DVT PPX - SCDs, Lovenox  Rehab - Will continue to follow for ongoing rehab needs and recommendations.  Recommend ACUTE inpatient rehabilitation for the functional deficits consisting of 3 hours of therapy/day & 24 hour RN/daily PMR physician for comorbid medical management. Patient will be able to tolerate 3 hours a day.    Continue bedside therapy as well as OOB throughout the day with mobilization by staff to maintain cardiopulmonary function and prevention of secondary complications related to debility.

## 2019-01-29 NOTE — PROGRESS NOTE ADULT - ASSESSMENT
27yo M s/p palate fell on him and IR embolization of pudendal artery that is improving.     -f/u H&H  - SW for rehab / disposition.

## 2019-01-30 LAB
ANION GAP SERPL CALC-SCNC: 10 MMOL/L — SIGNIFICANT CHANGE UP (ref 5–17)
ANISOCYTOSIS BLD QL: SLIGHT — SIGNIFICANT CHANGE UP
BASOPHILS NFR BLD AUTO: 1 % — SIGNIFICANT CHANGE UP (ref 0–2)
BUN SERPL-MCNC: 14 MG/DL — SIGNIFICANT CHANGE UP (ref 8–20)
CALCIUM SERPL-MCNC: 9.2 MG/DL — SIGNIFICANT CHANGE UP (ref 8.6–10.2)
CHLORIDE SERPL-SCNC: 94 MMOL/L — LOW (ref 98–107)
CO2 SERPL-SCNC: 27 MMOL/L — SIGNIFICANT CHANGE UP (ref 22–29)
CREAT SERPL-MCNC: 0.71 MG/DL — SIGNIFICANT CHANGE UP (ref 0.5–1.3)
CULTURE RESULTS: SIGNIFICANT CHANGE UP
CULTURE RESULTS: SIGNIFICANT CHANGE UP
EOSINOPHIL NFR BLD AUTO: 1 % — SIGNIFICANT CHANGE UP (ref 0–6)
GLUCOSE SERPL-MCNC: 123 MG/DL — HIGH (ref 70–115)
HCT VFR BLD CALC: 26.2 % — LOW (ref 42–52)
HGB BLD-MCNC: 8.8 G/DL — LOW (ref 14–18)
HYPOCHROMIA BLD QL: SLIGHT — SIGNIFICANT CHANGE UP
LYMPHOCYTES # BLD AUTO: 12 % — LOW (ref 20–55)
MACROCYTES BLD QL: SLIGHT — SIGNIFICANT CHANGE UP
MAGNESIUM SERPL-MCNC: 2.1 MG/DL — SIGNIFICANT CHANGE UP (ref 1.6–2.6)
MCHC RBC-ENTMCNC: 29.7 PG — SIGNIFICANT CHANGE UP (ref 27–31)
MCHC RBC-ENTMCNC: 33.6 G/DL — SIGNIFICANT CHANGE UP (ref 32–36)
MCV RBC AUTO: 88.5 FL — SIGNIFICANT CHANGE UP (ref 80–94)
MICROCYTES BLD QL: SLIGHT — SIGNIFICANT CHANGE UP
MONOCYTES NFR BLD AUTO: 9 % — SIGNIFICANT CHANGE UP (ref 3–10)
NEUTROPHILS NFR BLD AUTO: 75 % — HIGH (ref 37–73)
NRBC # BLD: 1 /100 — HIGH (ref 0–0)
OVALOCYTES BLD QL SMEAR: SLIGHT — SIGNIFICANT CHANGE UP
PHOSPHATE SERPL-MCNC: 3.5 MG/DL — SIGNIFICANT CHANGE UP (ref 2.4–4.7)
PLAT MORPH BLD: NORMAL — SIGNIFICANT CHANGE UP
PLATELET # BLD AUTO: 212 K/UL — SIGNIFICANT CHANGE UP (ref 150–400)
POIKILOCYTOSIS BLD QL AUTO: SLIGHT — SIGNIFICANT CHANGE UP
POTASSIUM SERPL-MCNC: 4.5 MMOL/L — SIGNIFICANT CHANGE UP (ref 3.5–5.3)
POTASSIUM SERPL-SCNC: 4.5 MMOL/L — SIGNIFICANT CHANGE UP (ref 3.5–5.3)
RBC # BLD: 2.96 M/UL — LOW (ref 4.6–6.2)
RBC # FLD: 14.1 % — SIGNIFICANT CHANGE UP (ref 11–15.6)
RBC BLD AUTO: ABNORMAL
SODIUM SERPL-SCNC: 131 MMOL/L — LOW (ref 135–145)
SPECIMEN SOURCE: SIGNIFICANT CHANGE UP
SPECIMEN SOURCE: SIGNIFICANT CHANGE UP
VARIANT LYMPHS # BLD: 2 % — SIGNIFICANT CHANGE UP (ref 0–6)
WBC # BLD: 10.5 K/UL — SIGNIFICANT CHANGE UP (ref 4.8–10.8)
WBC # FLD AUTO: 10.5 K/UL — SIGNIFICANT CHANGE UP (ref 4.8–10.8)

## 2019-01-30 PROCEDURE — 99232 SBSQ HOSP IP/OBS MODERATE 35: CPT

## 2019-01-30 PROCEDURE — 74177 CT ABD & PELVIS W/CONTRAST: CPT | Mod: 26

## 2019-01-30 PROCEDURE — 99231 SBSQ HOSP IP/OBS SF/LOW 25: CPT

## 2019-01-30 RX ORDER — ONDANSETRON 8 MG/1
4 TABLET, FILM COATED ORAL ONCE
Qty: 0 | Refills: 0 | Status: COMPLETED | OUTPATIENT
Start: 2019-01-30 | End: 2019-01-30

## 2019-01-30 RX ORDER — HYDROMORPHONE HYDROCHLORIDE 2 MG/ML
0.5 INJECTION INTRAMUSCULAR; INTRAVENOUS; SUBCUTANEOUS ONCE
Qty: 0 | Refills: 0 | Status: DISCONTINUED | OUTPATIENT
Start: 2019-01-30 | End: 2019-01-30

## 2019-01-30 RX ADMIN — HYDROMORPHONE HYDROCHLORIDE 2 MILLIGRAM(S): 2 INJECTION INTRAMUSCULAR; INTRAVENOUS; SUBCUTANEOUS at 15:34

## 2019-01-30 RX ADMIN — HYDROMORPHONE HYDROCHLORIDE 2 MILLIGRAM(S): 2 INJECTION INTRAMUSCULAR; INTRAVENOUS; SUBCUTANEOUS at 20:08

## 2019-01-30 RX ADMIN — Medication 650 MILLIGRAM(S): at 23:13

## 2019-01-30 RX ADMIN — HYDROMORPHONE HYDROCHLORIDE 0.5 MILLIGRAM(S): 2 INJECTION INTRAMUSCULAR; INTRAVENOUS; SUBCUTANEOUS at 11:45

## 2019-01-30 RX ADMIN — ENOXAPARIN SODIUM 30 MILLIGRAM(S): 100 INJECTION SUBCUTANEOUS at 23:13

## 2019-01-30 RX ADMIN — LIDOCAINE 1 PATCH: 4 CREAM TOPICAL at 12:44

## 2019-01-30 RX ADMIN — TRAMADOL HYDROCHLORIDE 50 MILLIGRAM(S): 50 TABLET ORAL at 00:37

## 2019-01-30 RX ADMIN — SENNA PLUS 2 TABLET(S): 8.6 TABLET ORAL at 22:15

## 2019-01-30 RX ADMIN — GABAPENTIN 300 MILLIGRAM(S): 400 CAPSULE ORAL at 04:20

## 2019-01-30 RX ADMIN — TRAMADOL HYDROCHLORIDE 50 MILLIGRAM(S): 50 TABLET ORAL at 08:45

## 2019-01-30 RX ADMIN — Medication 100 MILLIGRAM(S): at 05:12

## 2019-01-30 RX ADMIN — LIDOCAINE 1 PATCH: 4 CREAM TOPICAL at 19:52

## 2019-01-30 RX ADMIN — Medication 650 MILLIGRAM(S): at 00:37

## 2019-01-30 RX ADMIN — Medication 650 MILLIGRAM(S): at 12:39

## 2019-01-30 RX ADMIN — HYDROMORPHONE HYDROCHLORIDE 0.5 MILLIGRAM(S): 2 INJECTION INTRAMUSCULAR; INTRAVENOUS; SUBCUTANEOUS at 10:51

## 2019-01-30 RX ADMIN — TRAMADOL HYDROCHLORIDE 50 MILLIGRAM(S): 50 TABLET ORAL at 23:19

## 2019-01-30 RX ADMIN — HYDROMORPHONE HYDROCHLORIDE 2 MILLIGRAM(S): 2 INJECTION INTRAMUSCULAR; INTRAVENOUS; SUBCUTANEOUS at 04:20

## 2019-01-30 RX ADMIN — HYDROMORPHONE HYDROCHLORIDE 2 MILLIGRAM(S): 2 INJECTION INTRAMUSCULAR; INTRAVENOUS; SUBCUTANEOUS at 05:20

## 2019-01-30 RX ADMIN — GABAPENTIN 300 MILLIGRAM(S): 400 CAPSULE ORAL at 22:15

## 2019-01-30 RX ADMIN — Medication 650 MILLIGRAM(S): at 20:08

## 2019-01-30 RX ADMIN — HYDROMORPHONE HYDROCHLORIDE 2 MILLIGRAM(S): 2 INJECTION INTRAMUSCULAR; INTRAVENOUS; SUBCUTANEOUS at 12:38

## 2019-01-30 RX ADMIN — GABAPENTIN 300 MILLIGRAM(S): 400 CAPSULE ORAL at 12:39

## 2019-01-30 RX ADMIN — Medication 650 MILLIGRAM(S): at 06:12

## 2019-01-30 RX ADMIN — ONDANSETRON 4 MILLIGRAM(S): 8 TABLET, FILM COATED ORAL at 12:39

## 2019-01-30 RX ADMIN — HYDROMORPHONE HYDROCHLORIDE 2 MILLIGRAM(S): 2 INJECTION INTRAMUSCULAR; INTRAVENOUS; SUBCUTANEOUS at 19:07

## 2019-01-30 RX ADMIN — Medication 650 MILLIGRAM(S): at 05:12

## 2019-01-30 RX ADMIN — TRAMADOL HYDROCHLORIDE 50 MILLIGRAM(S): 50 TABLET ORAL at 16:22

## 2019-01-30 RX ADMIN — Medication 650 MILLIGRAM(S): at 19:08

## 2019-01-30 RX ADMIN — MAGNESIUM HYDROXIDE 30 MILLILITER(S): 400 TABLET, CHEWABLE ORAL at 12:50

## 2019-01-30 RX ADMIN — Medication 650 MILLIGRAM(S): at 13:00

## 2019-01-30 RX ADMIN — Medication 100 MILLIGRAM(S): at 12:45

## 2019-01-30 RX ADMIN — ENOXAPARIN SODIUM 30 MILLIGRAM(S): 100 INJECTION SUBCUTANEOUS at 12:43

## 2019-01-30 RX ADMIN — Medication 100 MILLIGRAM(S): at 22:15

## 2019-01-30 RX ADMIN — POLYETHYLENE GLYCOL 3350 17 GRAM(S): 17 POWDER, FOR SOLUTION ORAL at 12:40

## 2019-01-30 RX ADMIN — TRAMADOL HYDROCHLORIDE 50 MILLIGRAM(S): 50 TABLET ORAL at 17:43

## 2019-01-30 RX ADMIN — TRAMADOL HYDROCHLORIDE 50 MILLIGRAM(S): 50 TABLET ORAL at 22:19

## 2019-01-30 RX ADMIN — TRAMADOL HYDROCHLORIDE 50 MILLIGRAM(S): 50 TABLET ORAL at 09:43

## 2019-01-30 NOTE — PROGRESS NOTE ADULT - ASSESSMENT
27yo M s/p palate fell on him and IR embolization of pudendal artery that is improving.     -multiple H/H stable  - SW for rehab / disposition.

## 2019-01-30 NOTE — PROGRESS NOTE ADULT - SUBJECTIVE AND OBJECTIVE BOX
Patient had a fever overnight.   ACS made aware and now concern for abdominal source.  Patient managed to walk to bathroom with assist.   Pain is the limiting factor. Needed assist with getting legs off hte bed.     FUNCTIONAL PROGRESS  1/28  Bathing Training:     · Level of Schleicher	moderate assist (50% patients effort)	  · Physical Assist/Nonphysical Assist	1 person assist	    Upper Body Dressing Training:     · Level of Schleicher	minimum assist (75% patients effort); to don gown	  · Physical Assist/Nonphysical Assist	1 person assist	    Lower Body Dressing Training:     · Level of Schleicher	total assist to don socks	    Toilet Hygiene Training:     · Level of Schleicher	to be assessed	    Grooming Training:     · Level of Schleicher	supervision	    Eating/Self-Feeding Training:     · Level of Schleicher	independent	      REVIEW OF SYSTEMS  Constitutional - +fever,  +fatigue  HEENT - No vertigo, No neck pain  Neurological - No headaches, No memory loss, +loss of strength, No numbness, No tremors  Skin - No rashes, +lesions   Musculoskeletal - +joint pain, +joint swelling, +muscle pain  Psychiatric - No depression, No anxiety    VITALS  T(C): 37.2 (01-30-19 @ 04:15), Max: 38.5 (01-29-19 @ 14:45)  HR: 77 (01-30-19 @ 04:15) (77 - 99)  BP: 124/68 (01-30-19 @ 04:15) (124/68 - 147/70)  RR: 18 (01-30-19 @ 04:15) (18 - 18)  SpO2: 95% (01-30-19 @ 04:15) (95% - 96%)  Wt(kg): --    MEDICATIONS   acetaminophen   Tablet .. 650 milliGRAM(s) every 6 hours  docusate sodium 100 milliGRAM(s) three times a day  enoxaparin Injectable 30 milliGRAM(s) every 12 hours  gabapentin 300 milliGRAM(s) every 8 hours  HYDROmorphone   Tablet 2 milliGRAM(s) every 4 hours PRN  ibuprofen  Tablet. 400 milliGRAM(s) every 8 hours PRN  lidocaine   Patch 1 Patch daily  magnesium hydroxide Suspension 30 milliLiter(s) daily  ondansetron    Tablet 4 milliGRAM(s) daily  polyethylene glycol 3350 17 Gram(s) daily  senna 2 Tablet(s) at bedtime  traMADol 50 milliGRAM(s) every 6 hours PRN      RECENT LABS/IMAGING  CBC Full  -  ( 29 Jan 2019 07:21 )  WBC Count : x  Hemoglobin : 7.9 g/dL  Hematocrit : 24.5 %  Platelet Count - Automated : x  Mean Cell Volume : x  Mean Cell Hemoglobin : x  Mean Cell Hemoglobin Concentration : x  Auto Neutrophil # : x  Auto Lymphocyte # : x  Auto Monocyte # : x  Auto Eosinophil # : x  Auto Basophil # : x  Auto Neutrophil % : x  Auto Lymphocyte % : x  Auto Monocyte % : x  Auto Eosinophil % : x  Auto Basophil % : x            ----------------------------------------------------------------------------------------  PHYSICAL EXAM  Constitutional - NAD, Comfortable  Extremities - Mild swelling of the BLE  Neurologic Exam -                    Motor - deficits limited to the BLE due to pain                    LEFT    UE - ShAB 5/5, EF 5/5, EE 5/5, WE 5/5,  5/5                    RIGHT UE - ShAB 5/5, EF 5/5, EE 5/5, WE 5/5,  5/5                    LEFT    LE - HF 3/5, KE 1/5, DF 5/5, PF 5/5                    RIGHT LE - HF 2/5, KE 1/5, DF 5/5, PF 5/5        Sensory - Intact to LT   Psychiatric - Mood stable, Affect WNL  ----------------------------------------------------------------------------------------  ASSESSMENT/PLAN  28yMale with functional deficits after a work related injury sustaining sacral and pelvic fractures.   Pain - Tylenol, Neurontin, Ibuprofen, Lidoderm, Dilaudid, Tramadol  DVT PPX - SCDs, Lovenox  Rehab - Continue to recommend ACUTE inpatient rehabilitation for the functional deficits consisting of 3 hours of therapy/day & 24 hour RN/daily PMR physician for comorbid medical management. Patient will be able to tolerate 3 hours a day.    Continue bedside therapy as well as OOB throughout the day with mobilization by staff to maintain cardiopulmonary function and prevention of secondary complications related to debility.

## 2019-01-30 NOTE — PROGRESS NOTE ADULT - ATTENDING COMMENTS
Doing well.  Improving.  H&H stable.  Placement for acute rehab.
Fever overnight.  Increasing lower abd pain.  +Nausea.      Will check for leukocytosis.  Consider urinary retention, infected pelvic hematoma.  Check for urinary retention.  CT abd pelvis.
Pain tolerable.  HD and H&H stable.      Cont PT.  Dispo planning.
Agree with above.  The patient reports that the pain is controlled with the medication.  He has mild nausea with the narcotics.  Abdomen is soft, non distended with no localizing tenderness, extremities warm and well perfused, muscle compartments are soft.  Continue with pain control, PT.

## 2019-01-30 NOTE — PROGRESS NOTE ADULT - SUBJECTIVE AND OBJECTIVE BOX
HPI/OVERNIGHT EVENTS: Patient seen and examined at bedside this AM. Patient reported feelings of impending doom on oxy, patient was switched to Tramadol and PO dilaudid and reports adequate pain control with current regimen. Voiding, full bowel function. Denies fever, chills, nausea, vomitting, chest pain, SOB, dizziness, abd pain or any other concerning symptoms    Vital Signs Last 24 Hrs  T(C): 37.3 (29 Jan 2019 21:02), Max: 38.5 (29 Jan 2019 14:45)  T(F): 99.2 (29 Jan 2019 21:02), Max: 101.3 (29 Jan 2019 14:45)  HR: 99 (29 Jan 2019 21:02) (83 - 99)  BP: 147/70 (29 Jan 2019 21:02) (126/63 - 147/70)  BP(mean): --  RR: 18 (29 Jan 2019 21:02) (18 - 18)  SpO2: 96% (29 Jan 2019 21:02) (95% - 96%)    I&O's Detail    28 Jan 2019 07:01  -  29 Jan 2019 07:00  --------------------------------------------------------  IN:    Oral Fluid: 560 mL  Total IN: 560 mL    OUT:    Voided: 300 mL  Total OUT: 300 mL    Total NET: 260 mL      29 Jan 2019 07:01  -  30 Jan 2019 03:03  --------------------------------------------------------  IN:    Oral Fluid: 560 mL  Total IN: 560 mL    OUT:    Voided: 100 mL  Total OUT: 100 mL    Total NET: 460 mL          Constitutional: patient resting comfortably in bed, in no acute distress  HEENT: EOMI / PERRL   Neck: No JVD, full ROM without pain   Respiratory: CTAB, respirations are unlabored, no accessory muscle use, no conversational dyspnea  Cardiovascular: regular rate & rhythm  Gastrointestinal: Abdomen soft, non-tender, non-distended, no rebound tenderness / guarding  LABS:                        7.9    x     )-----------( x        ( 29 Jan 2019 07:21 )             24.5     01-28    137  |  102  |  9.0  ----------------------------<  101  4.2   |  26.0  |  0.63    Ca    8.4<L>      28 Jan 2019 07:16  Phos  3.4     01-28  Mg     2.1     01-28            MEDICATIONS  (STANDING):  acetaminophen   Tablet .. 650 milliGRAM(s) Oral every 6 hours  docusate sodium 100 milliGRAM(s) Oral three times a day  enoxaparin Injectable 30 milliGRAM(s) SubCutaneous every 12 hours  gabapentin 300 milliGRAM(s) Oral every 8 hours  lidocaine   Patch 1 Patch Transdermal daily  magnesium hydroxide Suspension 30 milliLiter(s) Oral daily  ondansetron    Tablet 4 milliGRAM(s) Oral daily  polyethylene glycol 3350 17 Gram(s) Oral daily  senna 2 Tablet(s) Oral at bedtime    MEDICATIONS  (PRN):  HYDROmorphone   Tablet 2 milliGRAM(s) Oral every 4 hours PRN breakthrough pain  ibuprofen  Tablet. 400 milliGRAM(s) Oral every 8 hours PRN Mild Pain (1 - 3)  traMADol 50 milliGRAM(s) Oral every 6 hours PRN Severe Pain (7 - 10)      MICRO:   Cultures     STUDIES:   EKG, CXR, U/S, CT, MRI

## 2019-01-31 VITALS — HEART RATE: 81 BPM | TEMPERATURE: 99 F | DIASTOLIC BLOOD PRESSURE: 63 MMHG | SYSTOLIC BLOOD PRESSURE: 130 MMHG

## 2019-01-31 LAB
ANION GAP SERPL CALC-SCNC: 10 MMOL/L — SIGNIFICANT CHANGE UP (ref 5–17)
BASOPHILS # BLD AUTO: 0 K/UL — SIGNIFICANT CHANGE UP (ref 0–0.2)
BASOPHILS NFR BLD AUTO: 0.4 % — SIGNIFICANT CHANGE UP (ref 0–2)
BUN SERPL-MCNC: 14 MG/DL — SIGNIFICANT CHANGE UP (ref 8–20)
CALCIUM SERPL-MCNC: 9.2 MG/DL — SIGNIFICANT CHANGE UP (ref 8.6–10.2)
CHLORIDE SERPL-SCNC: 93 MMOL/L — LOW (ref 98–107)
CO2 SERPL-SCNC: 29 MMOL/L — SIGNIFICANT CHANGE UP (ref 22–29)
CREAT ?TM UR-MCNC: 120 MG/DL — SIGNIFICANT CHANGE UP
CREAT SERPL-MCNC: 0.67 MG/DL — SIGNIFICANT CHANGE UP (ref 0.5–1.3)
EOSINOPHIL # BLD AUTO: 0.1 K/UL — SIGNIFICANT CHANGE UP (ref 0–0.5)
EOSINOPHIL NFR BLD AUTO: 1.3 % — SIGNIFICANT CHANGE UP (ref 0–5)
GLUCOSE SERPL-MCNC: 111 MG/DL — SIGNIFICANT CHANGE UP (ref 70–115)
HCT VFR BLD CALC: 26.1 % — LOW (ref 42–52)
HGB BLD-MCNC: 8.5 G/DL — LOW (ref 14–18)
LYMPHOCYTES # BLD AUTO: 1 K/UL — SIGNIFICANT CHANGE UP (ref 1–4.8)
LYMPHOCYTES # BLD AUTO: 11.8 % — LOW (ref 20–55)
MAGNESIUM SERPL-MCNC: 2.1 MG/DL — SIGNIFICANT CHANGE UP (ref 1.6–2.6)
MCHC RBC-ENTMCNC: 28.9 PG — SIGNIFICANT CHANGE UP (ref 27–31)
MCHC RBC-ENTMCNC: 32.6 G/DL — SIGNIFICANT CHANGE UP (ref 32–36)
MCV RBC AUTO: 88.8 FL — SIGNIFICANT CHANGE UP (ref 80–94)
MONOCYTES # BLD AUTO: 1 K/UL — HIGH (ref 0–0.8)
MONOCYTES NFR BLD AUTO: 11.8 % — HIGH (ref 3–10)
NEUTROPHILS # BLD AUTO: 6.2 K/UL — SIGNIFICANT CHANGE UP (ref 1.8–8)
NEUTROPHILS NFR BLD AUTO: 73.6 % — HIGH (ref 37–73)
OSMOLALITY SERPL: 300 MOSM/KG — SIGNIFICANT CHANGE UP (ref 280–300)
OSMOLALITY UR: 577 MOSM/KG — SIGNIFICANT CHANGE UP (ref 300–1000)
PHOSPHATE SERPL-MCNC: 3.8 MG/DL — SIGNIFICANT CHANGE UP (ref 2.4–4.7)
PLATELET # BLD AUTO: 237 K/UL — SIGNIFICANT CHANGE UP (ref 150–400)
POTASSIUM SERPL-MCNC: 4.4 MMOL/L — SIGNIFICANT CHANGE UP (ref 3.5–5.3)
POTASSIUM SERPL-SCNC: 4.4 MMOL/L — SIGNIFICANT CHANGE UP (ref 3.5–5.3)
RBC # BLD: 2.94 M/UL — LOW (ref 4.6–6.2)
RBC # FLD: 14.2 % — SIGNIFICANT CHANGE UP (ref 11–15.6)
SODIUM SERPL-SCNC: 132 MMOL/L — LOW (ref 135–145)
SODIUM UR-SCNC: 30 MMOL/L — SIGNIFICANT CHANGE UP
WBC # BLD: 8.3 K/UL — SIGNIFICANT CHANGE UP (ref 4.8–10.8)
WBC # FLD AUTO: 8.3 K/UL — SIGNIFICANT CHANGE UP (ref 4.8–10.8)

## 2019-01-31 PROCEDURE — 99232 SBSQ HOSP IP/OBS MODERATE 35: CPT

## 2019-01-31 RX ADMIN — GABAPENTIN 300 MILLIGRAM(S): 400 CAPSULE ORAL at 12:17

## 2019-01-31 RX ADMIN — ENOXAPARIN SODIUM 30 MILLIGRAM(S): 100 INJECTION SUBCUTANEOUS at 12:19

## 2019-01-31 RX ADMIN — Medication 650 MILLIGRAM(S): at 06:01

## 2019-01-31 RX ADMIN — Medication 650 MILLIGRAM(S): at 00:19

## 2019-01-31 RX ADMIN — ONDANSETRON 4 MILLIGRAM(S): 8 TABLET, FILM COATED ORAL at 12:17

## 2019-01-31 RX ADMIN — HYDROMORPHONE HYDROCHLORIDE 2 MILLIGRAM(S): 2 INJECTION INTRAMUSCULAR; INTRAVENOUS; SUBCUTANEOUS at 08:42

## 2019-01-31 RX ADMIN — Medication 100 MILLIGRAM(S): at 06:01

## 2019-01-31 RX ADMIN — Medication 650 MILLIGRAM(S): at 12:18

## 2019-01-31 RX ADMIN — Medication 650 MILLIGRAM(S): at 06:03

## 2019-01-31 RX ADMIN — TRAMADOL HYDROCHLORIDE 50 MILLIGRAM(S): 50 TABLET ORAL at 12:17

## 2019-01-31 RX ADMIN — Medication 100 MILLIGRAM(S): at 12:21

## 2019-01-31 RX ADMIN — HYDROMORPHONE HYDROCHLORIDE 2 MILLIGRAM(S): 2 INJECTION INTRAMUSCULAR; INTRAVENOUS; SUBCUTANEOUS at 16:10

## 2019-01-31 RX ADMIN — GABAPENTIN 300 MILLIGRAM(S): 400 CAPSULE ORAL at 06:01

## 2019-01-31 RX ADMIN — LIDOCAINE 1 PATCH: 4 CREAM TOPICAL at 00:03

## 2019-01-31 RX ADMIN — HYDROMORPHONE HYDROCHLORIDE 2 MILLIGRAM(S): 2 INJECTION INTRAMUSCULAR; INTRAVENOUS; SUBCUTANEOUS at 09:58

## 2019-01-31 NOTE — PROGRESS NOTE ADULT - SUBJECTIVE AND OBJECTIVE BOX
INTERVAL HPI/OVERNIGHT EVENTS: No acute events overnight    SUBJECTIVE: Mild pelvic pain this AM. Afebrile. Tolerating diet. Ambulating without assistance. +Urination. + Flatus. +BM. Denies F/C/N/V/CP/SOB.       MEDICATIONS  (STANDING):  acetaminophen   Tablet .. 650 milliGRAM(s) Oral every 6 hours  docusate sodium 100 milliGRAM(s) Oral three times a day  enoxaparin Injectable 30 milliGRAM(s) SubCutaneous every 12 hours  gabapentin 300 milliGRAM(s) Oral every 8 hours  lidocaine   Patch 1 Patch Transdermal daily  magnesium hydroxide Suspension 30 milliLiter(s) Oral daily  ondansetron    Tablet 4 milliGRAM(s) Oral daily  polyethylene glycol 3350 17 Gram(s) Oral daily  senna 2 Tablet(s) Oral at bedtime    MEDICATIONS  (PRN):  HYDROmorphone   Tablet 2 milliGRAM(s) Oral every 4 hours PRN breakthrough pain  ibuprofen  Tablet. 400 milliGRAM(s) Oral every 8 hours PRN Mild Pain (1 - 3)  traMADol 50 milliGRAM(s) Oral every 6 hours PRN Severe Pain (7 - 10)      Vital Signs Last 24 Hrs  T(C): 36.7 (31 Jan 2019 05:18), Max: 37.6 (30 Jan 2019 21:03)  T(F): 98.1 (31 Jan 2019 05:18), Max: 99.6 (30 Jan 2019 21:03)  HR: 77 (31 Jan 2019 05:18) (77 - 93)  BP: 126/67 (31 Jan 2019 05:18) (120/66 - 148/66)  BP(mean): --  RR: 18 (31 Jan 2019 05:18) (18 - 19)  SpO2: 95% (31 Jan 2019 00:00) (95% - 96%)    PE  Gen: AAO x3, NAD  Pulm: CTAB, Symmetrical chest rise  CV: RRR  Abd: Soft, NT, ND, -R/-G  Ext: No C/C/E  Vasc: 2+ Radial, DP pulses  Neuro: No focal neurological deficits      I&O's Detail    30 Jan 2019 07:01  -  31 Jan 2019 07:00  --------------------------------------------------------  IN:    Oral Fluid: 840 mL  Total IN: 840 mL    OUT:    Voided: 480 mL  Total OUT: 480 mL    Total NET: 360 mL          LABS:                        8.8    10.5  )-----------( 212      ( 30 Jan 2019 11:39 )             26.2     01-30    131<L>  |  94<L>  |  14.0  ----------------------------<  123<H>  4.5   |  27.0  |  0.71    Ca    9.2      30 Jan 2019 11:39  Phos  3.5     01-30  Mg     2.1     01-30

## 2019-01-31 NOTE — PROGRESS NOTE ADULT - PROVIDER SPECIALTY LIST ADULT
SUBJECTIVE:   She is here today for depression and anxiety. She 2 months postpartum and is doing well on her current dose of Zoloft. She took her Zoloft throughout her pregnancy. Her moods have been stable. Sleep is interrupted due to a small infant but otherwise normal.    She has chronic nasal congestion and snoring. She has a known nasal septal deviation to the left. She denies known sleep apnea. Her mother does have sleep apnea. She denies unusual rhinorrhea sore throat or cough.    She also has an umbilical hernia. It has been there since her first pregnancy 2 years ago. She denies nausea vomiting diarrhea constipation rectal bleeding. She has some discomfort with straining.    REVIEW OF SYSTEMS:  See history of present illness.    I have reviewed the allergies, medication list, vital signs, past medical, family and social history sections listed in the above medical record as well as any pertinent nursing notes.    OBJECTIVE:    Constitutional:  Well-developed, well-nourished, in no apparent distress, non-toxic appearance.  HEENT:  Pupils equal, round, reactive to light and accommodation, extraocular movements intact.  The external auditory canals are normal bilaterally. Tympanic membranes are normal. Nares are pink and edematous with obstruction on the left. There is a nasal septal deviation to the left. The oral mucosa is moist and without lesions. The pharynx is clear. No mass.  Moist mucous membranes.  No cervical or supraclavicular adenopathy. No thyromegaly or thyroid masses.  Respiratory:  No respiratory distress, normal breath sounds, no rales, wheezing or rhonchi noted.  Cardiovascular:  Normal rate, normal rhythm. No murmurs noted . No JVD or carotid bruits. Pedal pulses are +2. Peripheral edema.  Gastrointestinal:  Soft, nontender, non-distended, normal bowel sounds, no hepatosplenomegaly. No aortic bruit . There is a reducible umbilical hernia .  Neurologic:  Alert and oriented times 3, cranial  Trauma Surgery nerves 3-12 normal, normal motor function, normal sensory function, no focal deficits noted. Gait normal. Extremity strength is strong and equal. Romberg is negative.  Psychiatric:  Speech and behavior appropriate.  Patient cooperative. Not suicidal or homicidal.  Thought process intact and fluid.  Does not appear to be manic at this time.    Assessment & Plan      1. History of major depression    2. Umbilical hernia without obstruction and without gangrene    3. Nasal septal deviation      Orders Placed This Encounter   • SERVICE TO ENT   • sertraline (ZOLOFT) 100 MG tablet   • cetirizine (ZYRTEC ALLERGY) 10 MG tablet     She is going to work on core strengthening  She is offered a surgical consultation and will let us know when she is ready to schedule this.  Return in about 1 year (around 11/27/2018) for CPE.

## 2019-01-31 NOTE — PROGRESS NOTE ADULT - SUBJECTIVE AND OBJECTIVE BOX
Patient's abdomen is improved.   No more fevers. No WBC.    CT pelvis + showed:  1. Comparison to the previous CT abdomen and pelvis exam from 01/24/2019 shows interval enlargement of a left pelvic sidewall hematoma adjacent and superior to the left pubic ramus, now measuring 9.5 x 5.3 cm transversely and 6.1 cm   craniocaudally. This displaces the urinary bladder towards the right. No definite acute extravasation of IV contrast to suggest ongoing active bleeding however this is a slightly limited study, being late in the arterial phase.  2. Extension of retroperitoneal hematoma is seen from the pelvis up to the level of the upper poles of both kidneys.   3. High density is present in the left and right crura of the base of the penis, which may represent accumulation of IV contrast in this area secondary to local trauma and possible penile contusions.   4. A slightly displaced acute fracture of the left inferior ischiopubic ramus is present on image 161 of series 3. Minimally displaced fractures of the right sacral ala and right side sacral neural foramina are present, best seen on images 81 through 91 of series 4 and images 103 through 125 of series 3.   5. Interval increase in size of a subacute hematoma overlying the investing fascia lateral to the left tensor fascia latae and the left gluteus medius and vastus lateralis musculature. This is seen on image 134 of series 3 and image 63 of series 5, measuring 7.0 x 1.9 cm transversely and 10.3 cm craniocaudally. Soft tissue contusion is seen overlying the hematoma in the subcutaneous fat of the left lateral gluteal and hip region.    Final impression:  Interval increase in size of a retroperitoneal hematoma the largest portion of which is along the left pelvic sidewall extending from the left inferior pubic ramus and hemorrhage along the right pelvic sidewall which both extend superiorly to the level of the kidneys. Evaluation for active extravasation limited on the study performed in the portal venous phase; given the interval increase in size of the hematoma, active bleeding cannot be excluded. Interval increase in size of a hematoma lateral to the gluteus musculature.    FUNCTIONAL PROGRESS  1/30  Sit-Stand Transfer Training  Transfer Training Sit-to-Stand Transfer: minimum assist (75% patient effort);  1 person assist;  toe touch weight-bearing   rolling walker  Transfer Training Stand-to-Sit Transfer: minimum assist (75% patient effort);  1 person assist;  toe touch weight-bearing   rolling walker    Toilet Transfer Training  Transfer Training Toilet Transfer: minimum assist (75% patient effort);  1 person assist;  toe touch weight-bearing   rolling walker;  commode    Functional Endurance  Functional Endurance Detail: Pt ambulated with RW in room and bathroom with minimal assist with much improvement noted in endurance. Pt without any symptoms of dizziness/fatigue and pain, although high, is tolerable. Pt with fair endurance.     Lower Body Dressing Training  Lower Body Dressing Training Assistance: total assist to don socks; pt would benefit from sock aid        REVIEW OF SYSTEMS  Constitutional - No fever,  +fatigue  HEENT - No vertigo, No neck pain  Neurological - No headaches, No memory loss, +loss of strength, No numbness, No tremors  Skin - No rashes, +lesions   Musculoskeletal - +joint pain, +joint swelling, +muscle pain  Psychiatric - No depression, No anxiety    VITALS  T(C): 37 (01-31-19 @ 11:48), Max: 37.6 (01-30-19 @ 21:03)  HR: 87 (01-31-19 @ 11:48) (77 - 90)  BP: 123/66 (01-31-19 @ 11:48) (123/66 - 148/66)  RR: 18 (01-31-19 @ 11:48) (18 - 18)  SpO2: 95% (01-31-19 @ 11:48) (95% - 96%)  Wt(kg): --    MEDICATIONS   acetaminophen   Tablet .. 650 milliGRAM(s) every 6 hours  docusate sodium 100 milliGRAM(s) three times a day  enoxaparin Injectable 30 milliGRAM(s) every 12 hours  gabapentin 300 milliGRAM(s) every 8 hours  HYDROmorphone   Tablet 2 milliGRAM(s) every 4 hours PRN  ibuprofen  Tablet. 400 milliGRAM(s) every 8 hours PRN  lidocaine   Patch 1 Patch daily  magnesium hydroxide Suspension 30 milliLiter(s) daily  ondansetron    Tablet 4 milliGRAM(s) daily  polyethylene glycol 3350 17 Gram(s) daily  senna 2 Tablet(s) at bedtime  traMADol 50 milliGRAM(s) every 6 hours PRN      RECENT LABS/IMAGING  CBC Full  -  ( 31 Jan 2019 10:25 )  WBC Count : 8.3 K/uL  Hemoglobin : 8.5 g/dL  Hematocrit : 26.1 %  Platelet Count - Automated : 237 K/uL  Mean Cell Volume : 88.8 fl  Mean Cell Hemoglobin : 28.9 pg  Mean Cell Hemoglobin Concentration : 32.6 g/dL  Auto Neutrophil # : 6.2 K/uL  Auto Lymphocyte # : 1.0 K/uL  Auto Monocyte # : 1.0 K/uL  Auto Eosinophil # : 0.1 K/uL  Auto Basophil # : 0.0 K/uL  Auto Neutrophil % : 73.6 %  Auto Lymphocyte % : 11.8 %  Auto Monocyte % : 11.8 %  Auto Eosinophil % : 1.3 %  Auto Basophil % : 0.4 %    01-31    132<L>  |  93<L>  |  14.0  ----------------------------<  111  4.4   |  29.0  |  0.67    Ca    9.2      31 Jan 2019 10:25  Phos  3.8     01-31  Mg     2.1     01-31        ----------------------------------------------------------------------------------------  PHYSICAL EXAM  Constitutional - NAD, Comfortable  Extremities - Mild swelling of the BLE  Neurologic Exam -                    Motor - deficits limited to the BLE due to pain                    LEFT    UE - ShAB 5/5, EF 5/5, EE 5/5, WE 5/5,  5/5                    RIGHT UE - ShAB 5/5, EF 5/5, EE 5/5, WE 5/5,  5/5                    LEFT    LE - HF 3/5, KE 1/5, DF 5/5, PF 5/5                    RIGHT LE - HF 2/5, KE 1/5, DF 5/5, PF 5/5        Sensory - Intact to LT   Psychiatric - Mood stable, Affect WNL  ----------------------------------------------------------------------------------------  ASSESSMENT/PLAN  28yMale with functional deficits after a work related injury sustaining sacral and pelvic fractures.   Pain - Tylenol, Neurontin, Ibuprofen, Lidoderm, Dilaudid, Tramadol  DVT PPX - SCDs, Lovenox  Rehab - Continue to recommend ACUTE inpatient rehabilitation for the functional deficits consisting of 3 hours of therapy/day & 24 hour RN/daily PMR physician for comorbid medical management. Patient will be able to tolerate 3 hours a day.    Continue bedside therapy as well as OOB throughout the day with mobilization by staff to maintain cardiopulmonary function and prevention of secondary complications related to debility.

## 2019-01-31 NOTE — PROGRESS NOTE ADULT - REASON FOR ADMISSION
pinned under heavy sheet of fiberglass at work

## 2019-01-31 NOTE — PROGRESS NOTE ADULT - ASSESSMENT
29yo M s/p pelvic crush injury with resulting left inferior pubic rami fx., right sacral fracture and pelvic hematoma requiring IR embolization of pudendal artery, with evidence of interval increase on CT, Hgb stable.   -Monitor H/H, stable at this time  -OOB, Ambulate, Is use  -PRN pain control  -Bowel reg  -PT/OT  -DVT PPX: Lovenox, SCD's  Dispo: pending

## 2019-02-10 PROCEDURE — 36430 TRANSFUSION BLD/BLD COMPNT: CPT

## 2019-02-10 PROCEDURE — 81001 URINALYSIS AUTO W/SCOPE: CPT

## 2019-02-10 PROCEDURE — 87186 SC STD MICRODIL/AGAR DIL: CPT

## 2019-02-10 PROCEDURE — 83605 ASSAY OF LACTIC ACID: CPT

## 2019-02-10 PROCEDURE — 74018 RADEX ABDOMEN 1 VIEW: CPT

## 2019-02-10 PROCEDURE — 83930 ASSAY OF BLOOD OSMOLALITY: CPT

## 2019-02-10 PROCEDURE — C1887: CPT

## 2019-02-10 PROCEDURE — 85610 PROTHROMBIN TIME: CPT

## 2019-02-10 PROCEDURE — 87086 URINE CULTURE/COLONY COUNT: CPT

## 2019-02-10 PROCEDURE — 70450 CT HEAD/BRAIN W/O DYE: CPT

## 2019-02-10 PROCEDURE — 97530 THERAPEUTIC ACTIVITIES: CPT

## 2019-02-10 PROCEDURE — 85018 HEMOGLOBIN: CPT

## 2019-02-10 PROCEDURE — 86923 COMPATIBILITY TEST ELECTRIC: CPT

## 2019-02-10 PROCEDURE — 96374 THER/PROPH/DIAG INJ IV PUSH: CPT

## 2019-02-10 PROCEDURE — 36415 COLL VENOUS BLD VENIPUNCTURE: CPT

## 2019-02-10 PROCEDURE — 93005 ELECTROCARDIOGRAM TRACING: CPT

## 2019-02-10 PROCEDURE — 72131 CT LUMBAR SPINE W/O DYE: CPT

## 2019-02-10 PROCEDURE — C1760: CPT

## 2019-02-10 PROCEDURE — 84100 ASSAY OF PHOSPHORUS: CPT

## 2019-02-10 PROCEDURE — 74177 CT ABD & PELVIS W/CONTRAST: CPT

## 2019-02-10 PROCEDURE — 71045 X-RAY EXAM CHEST 1 VIEW: CPT

## 2019-02-10 PROCEDURE — C1769: CPT

## 2019-02-10 PROCEDURE — 97163 PT EVAL HIGH COMPLEX 45 MIN: CPT

## 2019-02-10 PROCEDURE — 71260 CT THORAX DX C+: CPT

## 2019-02-10 PROCEDURE — G0390: CPT

## 2019-02-10 PROCEDURE — 87040 BLOOD CULTURE FOR BACTERIA: CPT

## 2019-02-10 PROCEDURE — 96375 TX/PRO/DX INJ NEW DRUG ADDON: CPT

## 2019-02-10 PROCEDURE — 80053 COMPREHEN METABOLIC PANEL: CPT

## 2019-02-10 PROCEDURE — 99291 CRITICAL CARE FIRST HOUR: CPT | Mod: 25

## 2019-02-10 PROCEDURE — 72125 CT NECK SPINE W/O DYE: CPT

## 2019-02-10 PROCEDURE — 72170 X-RAY EXAM OF PELVIS: CPT

## 2019-02-10 PROCEDURE — 83735 ASSAY OF MAGNESIUM: CPT

## 2019-02-10 PROCEDURE — 76942 ECHO GUIDE FOR BIOPSY: CPT

## 2019-02-10 PROCEDURE — 86900 BLOOD TYPING SEROLOGIC ABO: CPT

## 2019-02-10 PROCEDURE — 83935 ASSAY OF URINE OSMOLALITY: CPT

## 2019-02-10 PROCEDURE — 82962 GLUCOSE BLOOD TEST: CPT

## 2019-02-10 PROCEDURE — 80307 DRUG TEST PRSMV CHEM ANLYZR: CPT

## 2019-02-10 PROCEDURE — 86901 BLOOD TYPING SEROLOGIC RH(D): CPT

## 2019-02-10 PROCEDURE — 86850 RBC ANTIBODY SCREEN: CPT

## 2019-02-10 PROCEDURE — 85730 THROMBOPLASTIN TIME PARTIAL: CPT

## 2019-02-10 PROCEDURE — 82570 ASSAY OF URINE CREATININE: CPT

## 2019-02-10 PROCEDURE — 85027 COMPLETE CBC AUTOMATED: CPT

## 2019-02-10 PROCEDURE — 80048 BASIC METABOLIC PNL TOTAL CA: CPT

## 2019-02-10 PROCEDURE — 75894 X-RAYS TRANSCATH THERAPY: CPT

## 2019-02-10 PROCEDURE — P9016: CPT

## 2019-02-10 PROCEDURE — 97167 OT EVAL HIGH COMPLEX 60 MIN: CPT

## 2019-02-10 PROCEDURE — 85014 HEMATOCRIT: CPT

## 2019-02-10 PROCEDURE — 84300 ASSAY OF URINE SODIUM: CPT

## 2019-02-10 PROCEDURE — 97116 GAIT TRAINING THERAPY: CPT

## 2019-02-10 PROCEDURE — 93970 EXTREMITY STUDY: CPT

## 2019-02-10 PROCEDURE — C1894: CPT

## 2019-02-22 PROBLEM — Z00.00 ENCOUNTER FOR PREVENTIVE HEALTH EXAMINATION: Status: ACTIVE | Noted: 2019-02-22

## 2019-02-26 ENCOUNTER — APPOINTMENT (OUTPATIENT)
Dept: ORTHOPEDIC SURGERY | Facility: CLINIC | Age: 29
End: 2019-02-26
Payer: OTHER MISCELLANEOUS

## 2019-02-26 VITALS
TEMPERATURE: 97.8 F | DIASTOLIC BLOOD PRESSURE: 67 MMHG | SYSTOLIC BLOOD PRESSURE: 121 MMHG | HEART RATE: 70 BPM | WEIGHT: 188 LBS | BODY MASS INDEX: 25.47 KG/M2 | HEIGHT: 72 IN

## 2019-02-26 PROCEDURE — 72190 X-RAY EXAM OF PELVIS: CPT

## 2019-02-26 PROCEDURE — 99203 OFFICE O/P NEW LOW 30 MIN: CPT | Mod: 25

## 2019-02-26 PROCEDURE — 27197 CLSD TX PELVIC RING FX: CPT

## 2019-02-26 NOTE — PHYSICAL EXAM
[de-identified] : Physical Exam:\par General: Well appearing, no acute distress, A&O\par Neurologic: A&Ox3, No focal deficits\par Head: NCAT without abrasions, lacerations, or ecchymosis to head, face, or scalp\par Eyes: No scleral icterus, no gross abnormalities\par Respiratory: Equal chest wall expansion bilaterally, no accessory muscle use\par Lymphatic: No lymphadenopathy palpated\par Skin: Warm and dry\par Psychiatric: Normal mood and affect\par Lower Extremities:\par RIGHT LE: No deformities, abrasions, or other signs of trauma at hip, thigh, knee, leg, ankle or foot.  Full ROM pain at hip with IR, knee, ankle and toe with negative log-roll and Stinchfield tests.\par \par RLE strength: 		Hip flexion		5/5\par 			Quads			5/5\par 			Hamstrings		5/5\par 			Tib Anterior		5/5\par 			Gastroc		5/5\par 			EHL			5/5\par 			FHL			5/5\par RLE sensation intact to sural/saphenous/sup peroneal/deep peroneal/post tib distributions\par 2+ DP/PT pulses with good cap refill distally\par \par LEFT LE: No deformities, abrasions, or other signs of trauma at hip, thigh, knee, leg, ankle or foot.  Full ROM with pain with hip flexion, knee, ankle and toe with negative log-roll and Stinchfield tests.\par \par LLE strength: 		Hip flexion		5/5\par 			Quads			5/5\par 			Hamstrings		5/5\par 			Tib Anterior		5/5\par 			Gastroc		5/5\par 			EHL			5/5\par 			FHL			5/5\par LLE sensation intact to sural/saphenous/sup peroneal/deep peroneal/post tib distributions\par 2+ DP/PT pulses with good cap refill distally\par Pelvis: Mild tenderness elicited with AP stress or rotational stress of pelvis,  TTP at pubic symphysis.  [de-identified] : 3 views of the pelvis were obtained today. They show healing of the pelvic ring fractures previously seen on Hospital imaging.

## 2019-02-26 NOTE — HISTORY OF PRESENT ILLNESS
[Stable] : stable [6] : a current pain level of 6/10 [Walking] : walking [Intermit.] : ~He/She~ states the symptoms seem to be intermittent [Weight Bearing] : worsened by weight bearing [Acetaminophen] : relieved by acetaminophen [Physical Therapy] : relieved by physical therapy [Rest] : relieved by rest [de-identified] : 29yo M presents for HFU after sustaining an injury at work 1/24/19 from a heavy Fiberglass pinned him down.  He sustained Left inferior pubic ramus fx and R sacral ala fracture.  He reports intermittent groin pain.  he has been ambulating with a walker and TTWB to [facility].  He has been taking tylenol as needed for pain.  He has home PT at this time.   The patient states the pain is made worse with activity and relieved with rest.

## 2019-02-26 NOTE — DISCUSSION/SUMMARY
[de-identified] : 28-year-old male with left inferior pubic ramus fracture right sacral ala fracture - stable.  he was provided a prescription physical therapy to progress to weightbearing as tolerated, core strengthening and gait training.  Patient will continue taking Tylenol as needed for pain. He'll followup in one month for repeat clinical assessment and x-rays.\par \par Orthopaedic Trauma Surgeon Addendum:\par \par I agree with the above nurse practitioner note.  Appropriate imaging has been reviewed and the plan adjusted as needed.\par \par Isai Morrow MD\par Orthopaedic Trauma Surgeon\par [facility]\par [facility] [place]\par \par \par \par

## 2019-03-07 ENCOUNTER — APPOINTMENT (OUTPATIENT)
Dept: TRAUMA SURGERY | Facility: CLINIC | Age: 29
End: 2019-03-07
Payer: OTHER MISCELLANEOUS

## 2019-03-07 VITALS
SYSTOLIC BLOOD PRESSURE: 126 MMHG | HEIGHT: 71 IN | OXYGEN SATURATION: 98 % | DIASTOLIC BLOOD PRESSURE: 71 MMHG | WEIGHT: 199 LBS | HEART RATE: 91 BPM | BODY MASS INDEX: 27.86 KG/M2 | TEMPERATURE: 98.8 F

## 2019-03-07 DIAGNOSIS — S32.9XXA FRACTURE OF UNSPECIFIED PARTS OF LUMBOSACRAL SPINE AND PELVIS, INITIAL ENCOUNTER FOR CLOSED FRACTURE: ICD-10-CM

## 2019-03-07 PROCEDURE — 99214 OFFICE O/P EST MOD 30 MIN: CPT

## 2019-03-19 ENCOUNTER — APPOINTMENT (OUTPATIENT)
Age: 29
End: 2019-03-19
Payer: OTHER MISCELLANEOUS

## 2019-03-19 PROBLEM — S32.9XXA PELVIC FRACTURE: Status: ACTIVE | Noted: 2019-02-25

## 2019-03-19 PROCEDURE — 72190 X-RAY EXAM OF PELVIS: CPT

## 2019-03-19 PROCEDURE — 99214 OFFICE O/P EST MOD 30 MIN: CPT

## 2019-03-19 NOTE — PHYSICAL EXAM
[de-identified] : Physical Exam:\par General: Well appearing, no acute distress, A&O\par Neurologic: A&Ox3, No focal deficits\par Head: NCAT without abrasions, lacerations, or ecchymosis to head, face, or scalp\par Eyes: No scleral icterus, no gross abnormalities\par Respiratory: Equal chest wall expansion bilaterally, no accessory muscle use\par Lymphatic: No lymphadenopathy palpated\par Skin: Warm and dry\par Psychiatric: Normal mood and affect\par Lower Extremities:\par RIGHT LE: No deformities, abrasions, or other signs of trauma at hip, thigh, knee, leg, ankle or foot.  Full ROM pain at hip with IR, knee ROM 0-130 degrees TTP to patella tendon and medial joint line + yina, ankle and toe with negative log-roll and Stinchfield tests.\par \par RLE strength: 		Hip flexion		5/5\par 			Quads			5/5\par 			Hamstrings		5/5\par 			Tib Anterior		5/5\par 			Gastroc		5/5\par 			EHL			5/5\par 			FHL			5/5\par RLE sensation intact to sural/saphenous/sup peroneal/deep peroneal/post tib distributions\par 2+ DP/PT pulses with good cap refill distally\par \par LEFT LE: No deformities, abrasions, or other signs of trauma at hip, thigh, knee, leg, ankle or foot.  Full ROM with pain with hip flexion, knee, ankle and toe with negative log-roll and Stinchfield tests.\par \par LLE strength: 		Hip flexion		5/5\par 			Quads			5/5\par 			Hamstrings		5/5\par 			Tib Anterior		5/5\par 			Gastroc		5/5\par 			EHL			5/5\par 			FHL			5/5\par LLE sensation intact to sural/saphenous/sup peroneal/deep peroneal/post tib distributions\par 2+ DP/PT pulses with good cap refill distally\par Pelvis: Mild tenderness elicited with AP stress or rotational stress of pelvis,  TTP at pubic symphysis.  [de-identified] : 3 views of the pelvis were obtained today. They show healing of the pelvic ring fractures previously seen on Hospital imaging.

## 2019-03-19 NOTE — HISTORY OF PRESENT ILLNESS
[Pain Location] : pain [Stable] : stable [4] : a current pain level of 4/10 [Standing] : worsened by standing [Walking] : worsened by walking [Sitting] : worsened by sitting [Physical Therapy] : relieved by physical therapy [de-identified] : 28-year-old male presents for fracture care of left inferior pubic ramus fracture right sacral ala fracture.  he is ambulating without assistive devices.  He states every time he walks, sitting to standing and going up stairs he has R groin pain and anterior knee pain.  he feels a catching sensation in the groin and right knee that produces sharp pain. He has been doing PT without any improvement of the R hip pain and r knee.  He takes tylenol when needed for pain control [de-identified] : sharp

## 2019-03-19 NOTE — PHYSICAL EXAM
[Normal Breath Sounds] : Normal breath sounds [Abdomen Tenderness] : ~T ~M No abdominal tenderness [Normal Heart Sounds] : normal heart sounds [No Rash or Lesion] : No rash or lesion [Alert] : alert [Oriented to Person] : oriented to person [Oriented to Place] : oriented to place [Oriented to Time] : oriented to time [Calm] : calm [de-identified] : wdwn  male  in  nad    resting  comfortably on stretcher [de-identified] : PERRLA  EOMS intact non  icteric s leti [de-identified] : trachea  midline   from [de-identified] : soft  no  guarding [de-identified] : tender  to palpation  to  pelvic  area    nl  pedal pulse  b/l  nl sensation b/l

## 2019-03-19 NOTE — VITALS
[Aching] : aching [Continuous] : continuous [FreeTextEntry1] : rest [FreeTextEntry2] : too much movement

## 2019-03-19 NOTE — HISTORY OF PRESENT ILLNESS
[FreeTextEntry1] : Patient presents  to [facility]  clinic for  recheck  s/p  work injury where patient sustained fracture  to pelvis and  sacrum aftwer  being  pinned  under hevy fibergalss Patient ambulating  with  walker assistance    No  additional trauma   PT  at  home   No  fevers  no  chills  nl bm nl urination   No  abdominal pain   no chest  pain no  sob Wife at  bedside  for  entire  exam

## 2019-03-19 NOTE — DISCUSSION/SUMMARY
[de-identified] : 28-year-old male with left inferior pubic ramus fracture right sacral ala fracture - stable.  he was provided a prescription physical therapy to progress to weightbearing as tolerated, core strengthening and gait training.  Patient will continue taking Tylenol as needed for pain. \par \par He does have significant pain & catching in the hip and knee and we will order an MRI of both to evaluate for internal derangement. He will call when the MRI is complete.\par \par Isai Morrow MD\par Orthopaedic Trauma Surgeon\par [facility]\par [facility] [place]\par \par \par \par

## 2019-03-19 NOTE — ASSESSMENT
[FreeTextEntry1] : RTC  prn  \par F/u  orthopedist  as  instructed\par any acute symptoms  and  or  concerns  call back [facility] or  go  directly  to [facility]

## 2019-03-21 ENCOUNTER — OTHER (OUTPATIENT)
Age: 29
End: 2019-03-21

## 2019-03-27 ENCOUNTER — OTHER (OUTPATIENT)
Age: 29
End: 2019-03-27

## 2019-03-28 ENCOUNTER — APPOINTMENT (OUTPATIENT)
Dept: MRI IMAGING | Facility: CLINIC | Age: 29
End: 2019-03-28

## 2019-04-08 ENCOUNTER — FORM ENCOUNTER (OUTPATIENT)
Age: 29
End: 2019-04-08

## 2019-04-09 ENCOUNTER — OUTPATIENT (OUTPATIENT)
Dept: OUTPATIENT SERVICES | Facility: HOSPITAL | Age: 29
LOS: 1 days | End: 2019-04-09
Payer: OTHER MISCELLANEOUS

## 2019-04-09 ENCOUNTER — APPOINTMENT (OUTPATIENT)
Dept: MRI IMAGING | Facility: CLINIC | Age: 29
End: 2019-04-09

## 2019-04-09 DIAGNOSIS — S32.810D MULTIPLE FRACTURES OF PELVIS WITH STABLE DISRUPTION OF PELVIC RING, SUBSEQUENT ENCOUNTER FOR FRACTURE WITH ROUTINE HEALING: ICD-10-CM

## 2019-04-09 PROCEDURE — 73721 MRI JNT OF LWR EXTRE W/O DYE: CPT | Mod: 26,RT

## 2019-04-10 ENCOUNTER — OTHER (OUTPATIENT)
Age: 29
End: 2019-04-10

## 2019-04-12 ENCOUNTER — RESULT REVIEW (OUTPATIENT)
Age: 29
End: 2019-04-12

## 2019-04-26 ENCOUNTER — OTHER (OUTPATIENT)
Age: 29
End: 2019-04-26

## 2019-05-13 ENCOUNTER — APPOINTMENT (OUTPATIENT)
Dept: ORTHOPEDIC SURGERY | Facility: CLINIC | Age: 29
End: 2019-05-13
Payer: OTHER MISCELLANEOUS

## 2019-05-13 VITALS
WEIGHT: 199 LBS | DIASTOLIC BLOOD PRESSURE: 61 MMHG | BODY MASS INDEX: 27.86 KG/M2 | HEIGHT: 71 IN | HEART RATE: 69 BPM | SYSTOLIC BLOOD PRESSURE: 132 MMHG

## 2019-05-13 PROCEDURE — 99214 OFFICE O/P EST MOD 30 MIN: CPT

## 2019-05-13 RX ORDER — ACETAMINOPHEN 500 MG/1
TABLET, COATED ORAL
Refills: 0 | Status: ACTIVE | COMMUNITY

## 2019-05-13 NOTE — HISTORY OF PRESENT ILLNESS
[de-identified] : 27yo M presents for HFU after sustaining an injury at work 1/24/19 from a heavy Fiberglass pinned him down.  He sustained Left inferior pubic ramus fx and R sacral ala fracture.  He reports intermittent groin pain.  he has been ambulating with no assistive devices and has returned to work light duty.  He has been taking tylenol as needed for pain.  He is hoping to continue light duty for another month and a half.   The patient states the pain is made worse with activity and relieved with rest.  [3] : a current pain level of 3/10 [de-identified] : ache

## 2019-05-13 NOTE — PHYSICAL EXAM
[de-identified] : Physical Exam:\par General: Well appearing, no acute distress, A&O\par Neurologic: A&Ox3, No focal deficits\par Head: NCAT without abrasions, lacerations, or ecchymosis to head, face, or scalp\par Eyes: No scleral icterus, no gross abnormalities\par Respiratory: Equal chest wall expansion bilaterally, no accessory muscle use\par Lymphatic: No lymphadenopathy palpated\par Skin: Warm and dry\par Psychiatric: Normal mood and affect\par Lower Extremities:\par RIGHT LE: No deformities, abrasions, or other signs of trauma at hip, thigh, knee, leg, ankle or foot. Full ROM pain at hip with IR, knee ROM 0-130 degrees TTP to patella tendon and medial joint line + yina, ankle and toe with negative log-roll and Stinchfield tests.\par \par RLE strength: 		Hip flexion		5/5\par 			Quads			5/5\par 			Hamstrings		5/5\par 			Tib Anterior		5/5\par 			Gastroc		5/5\par 			EHL			5/5\par 			FHL			5/5\par RLE sensation intact to sural/saphenous/sup peroneal/deep peroneal/post tib distributions\par 2+ DP/PT pulses with good cap refill distally\par \par LEFT LE: No deformities, abrasions, or other signs of trauma at hip, thigh, knee, leg, ankle or foot. Full ROM with pain with hip flexion, knee, ankle and toe with negative log-roll and Stinchfield tests.\par \par LLE strength: 		Hip flexion		5/5\par 			Quads			5/5\par 			Hamstrings		5/5\par 			Tib Anterior		5/5\par 			Gastroc		5/5\par 			EHL			5/5\par 			FHL			5/5\par LLE sensation intact to sural/saphenous/sup peroneal/deep peroneal/post tib distributions\par 2+ DP/PT pulses with good cap refill distally\par Pelvis: Mild tenderness elicited with AP stress or rotational stress of pelvis, TTP at pubic symphysis. \par Range of Motion: \par  [de-identified] : no new imaging today

## 2019-05-13 NOTE — DISCUSSION/SUMMARY
[de-identified] : 28-year-old male with left inferior pubic ramus fracture right sacral ala fracture - stable. he was provided a prescription physical therapy to continue with weightbearing as tolerated, core strengthening and gait training. Patient will continue taking Tylenol as needed for pain. \par \par He does occasional significant pain & catching in the hip. He is improving but still having pain. If he is unable to return to normal activity levels in a few weeks, may need to evaluate with our sports medicine team for discussion of the hip injury. Ok to return to dull duty when he feels able.\par \par Isai Morrow MD\par Orthopaedic Trauma Surgeon\par [facility]\par [facility] [place]

## 2019-06-03 ENCOUNTER — APPOINTMENT (OUTPATIENT)
Dept: ORTHOPEDIC SURGERY | Facility: CLINIC | Age: 29
End: 2019-06-03

## 2019-07-03 ENCOUNTER — OTHER (OUTPATIENT)
Age: 29
End: 2019-07-03

## 2019-07-03 ENCOUNTER — APPOINTMENT (OUTPATIENT)
Dept: ORTHOPEDIC SURGERY | Facility: CLINIC | Age: 29
End: 2019-07-03

## 2019-07-10 ENCOUNTER — OTHER (OUTPATIENT)
Age: 29
End: 2019-07-10

## 2019-07-10 ENCOUNTER — APPOINTMENT (OUTPATIENT)
Dept: ORTHOPEDIC SURGERY | Facility: CLINIC | Age: 29
End: 2019-07-10
Payer: OTHER MISCELLANEOUS

## 2019-07-10 VITALS
WEIGHT: 199 LBS | SYSTOLIC BLOOD PRESSURE: 132 MMHG | BODY MASS INDEX: 27.86 KG/M2 | HEART RATE: 63 BPM | HEIGHT: 71 IN | DIASTOLIC BLOOD PRESSURE: 76 MMHG

## 2019-07-10 PROCEDURE — 72190 X-RAY EXAM OF PELVIS: CPT

## 2019-07-10 PROCEDURE — 99214 OFFICE O/P EST MOD 30 MIN: CPT

## 2019-07-10 NOTE — PHYSICAL EXAM
[de-identified] : Physical Exam:\par General: Well appearing, no acute distress, A&O\par Neurologic: A&Ox3, No focal deficits\par Head: NCAT without abrasions, lacerations, or ecchymosis to head, face, or scalp\par Eyes: No scleral icterus, no gross abnormalities\par Respiratory: Equal chest wall expansion bilaterally, no accessory muscle use\par Lymphatic: No lymphadenopathy palpated\par Skin: Warm and dry\par Psychiatric: Normal mood and affect\par Lower Extremities:\par RIGHT LE: No deformities, abrasions, or other signs of trauma at hip, thigh, knee, leg, ankle or foot. Full ROM pain at hip with IR, knee ROM 0-130 degrees TTP to patella tendon and medial joint line + yina, ankle and toe with negative log-roll and Stinchfield tests. + TTP lesser trochanter\par \par RLE strength: 		Hip flexion		5/5\par 			Quads			5/5\par 			Hamstrings		5/5\par 			Tib Anterior		5/5\par 			Gastroc		5/5\par 			EHL			5/5\par 			FHL			5/5\par RLE sensation intact to sural/saphenous/sup peroneal/deep peroneal/post tib distributions\par 2+ DP/PT pulses with good cap refill distally\par \par LEFT LE: No deformities, abrasions, or other signs of trauma at hip, thigh, knee, leg, ankle or foot. Full ROM with pain with hip flexion, knee, ankle and toe with negative log-roll and Stinchfield tests.\par \par LLE strength: 		Hip flexion		5/5\par 			Quads			5/5\par 			Hamstrings		5/5\par 			Tib Anterior		5/5\par 			Gastroc		5/5\par 			EHL			5/5\par 			FHL			5/5\par LLE sensation intact to sural/saphenous/sup peroneal/deep peroneal/post tib distributions\par 2+ DP/PT pulses with good cap refill distally\par Pelvis: Mild tenderness elicited with AP stress or rotational stress of pelvis, TTP at pubic symphysis. \par Range of Motion: \par  [de-identified] : 3 views of the pelvis were obtained today. They show healing of previously seen fractures

## 2019-07-10 NOTE — REASON FOR VISIT
[Follow-Up Visit] : a follow-up visit for [Worker's Compensation] : this visit is related to worker's compensation [Other: ____] : [unfilled] [FreeTextEntry2] : DOI: 01/24/19

## 2019-07-10 NOTE — DISCUSSION/SUMMARY
[de-identified] : 28-year-old male with left inferior pubic ramus fracture right sacral ala fracture healing well and stable. he was provided a prescription physical therapy to continue with weightbearing as tolerated, core strengthening and gait training. Patient will continue taking Tylenol as needed for pain. \par \par He does occasional significant pain & catching in the hip. He is improving but still having pain. If he is unable to return to normal activity levels in a few weeks, may need to evaluate with our sports medicine team for further discussion of the hip injury. Ok to return to dull duty when he feels able.\par \par Isai Morrow MD\par Orthopaedic Trauma Surgeon\par [facility]\par [facility] [place]

## 2019-07-10 NOTE — HISTORY OF PRESENT ILLNESS
[de-identified] : 29yo M presents for HFU after sustaining an injury at work 1/24/19 from a heavy Fiberglass sheet that pinned him down.  He sustained Left inferior pubic ramus fx and R sacral ala fracture.  He reports intermittent groin pain.  he has been ambulating with no assistive devices and has returned to work light duty.  He has been taking tylenol as needed for pain.  He tried to go back to work full to be but is now having increased lower back and groin pain.   The patient states the pain is made worse with activity and relieved with rest.  [4] : a current pain level of 4/10 [Bending] : worsened by bending [Lifting] : worsened by lifting [Recumbency] : relieved by recumbency [Rest] : relieved by rest

## 2019-08-06 NOTE — ED PROVIDER NOTE - ATTENDING CONTRIBUTION TO CARE
Medication:    Outpatient Medications Marked as Taking for the 8/5/19 encounter (Refill) with Ania Wagner MD   Medication Sig Dispense Refill   • venlafaxine XR (EFFEXOR XR) 75 MG 24 hr capsule Take 1 capsule by mouth daily. 90 capsule 1       Message to Prescriber:     [x] Pharmacy has been verified.    [] Patient completely out of medication (*Route encounter as high priority if checked)    [] Patient informed refill request can take up to 3 business days to be processed    Patient currently has follow up appointment scheduled      
Patient has refills on file no need for refill at present refill refused   
Patient arrived via EMS code trauma activated in the field patient c/o back pain after a crush injury at work.  Patient was pinned to the ground after a heavy load of fiberglass fell on top of him.  No LOC.  Patient with lower lumbar tenderness on exam.  CT shows pelvic fracture with extravasation on contrast.  Patient admitted to SICU.

## 2019-08-19 ENCOUNTER — APPOINTMENT (OUTPATIENT)
Dept: ORTHOPEDIC SURGERY | Facility: CLINIC | Age: 29
End: 2019-08-19
Payer: OTHER MISCELLANEOUS

## 2019-08-19 VITALS
DIASTOLIC BLOOD PRESSURE: 78 MMHG | SYSTOLIC BLOOD PRESSURE: 126 MMHG | BODY MASS INDEX: 26.04 KG/M2 | HEIGHT: 71 IN | HEART RATE: 71 BPM | WEIGHT: 186 LBS

## 2019-08-19 DIAGNOSIS — M25.561 PAIN IN RIGHT KNEE: ICD-10-CM

## 2019-08-19 PROCEDURE — 99214 OFFICE O/P EST MOD 30 MIN: CPT

## 2019-08-19 PROCEDURE — 72190 X-RAY EXAM OF PELVIS: CPT

## 2019-08-21 ENCOUNTER — OTHER (OUTPATIENT)
Age: 29
End: 2019-08-21

## 2019-12-02 ENCOUNTER — APPOINTMENT (OUTPATIENT)
Dept: ORTHOPEDIC SURGERY | Facility: CLINIC | Age: 29
End: 2019-12-02
Payer: OTHER MISCELLANEOUS

## 2019-12-02 VITALS
DIASTOLIC BLOOD PRESSURE: 76 MMHG | BODY MASS INDEX: 25.9 KG/M2 | SYSTOLIC BLOOD PRESSURE: 124 MMHG | WEIGHT: 185 LBS | HEART RATE: 83 BPM | HEIGHT: 71 IN

## 2019-12-02 DIAGNOSIS — M54.5 LOW BACK PAIN: ICD-10-CM

## 2019-12-02 DIAGNOSIS — S32.810D MULTIPLE FRACTURES OF PELVIS WITH STABLE DISRUPTION OF PELVIC RING, SUBSEQUENT ENCOUNTER FOR FRACTURE WITH ROUTINE HEALING: ICD-10-CM

## 2019-12-02 DIAGNOSIS — S83.104A UNSPECIFIED DISLOCATION OF RIGHT KNEE, INITIAL ENCOUNTER: ICD-10-CM

## 2019-12-02 PROCEDURE — 99214 OFFICE O/P EST MOD 30 MIN: CPT

## 2019-12-02 NOTE — HISTORY OF PRESENT ILLNESS
[de-identified] : 29 year old Male presents for follow up after sustaining an injury at work 1/24/19 from a heavy Fiberglass sheet that pinned him down.  He sustained Left inferior pubic ramus fx and R sacral ala fracture.  He reports intermittent groin pain as well as right knee and bilateral lower back pain.  he has been ambulating with no assistive devices and has returned to work light duty.  He has been taking tylenol as needed for pain.  He tried to go back to work full to be but is having increased lower back, knee and groin pain. he has been unable to return to work secondary to pain and activity limitations.  The patient states the pain is made worse with activity and relieved with rest.  4/10. states the knee is burning in sensation [Bending] : worsened by bending [Lifting] : worsened by lifting [Recumbency] : relieved by recumbency [Rest] : relieved by rest

## 2019-12-02 NOTE — DISCUSSION/SUMMARY
[de-identified] : 29-year-old male with left inferior pubic ramus fracture, right sacral ala fracture healing well and stable. He also has pain in the right hip and right knee likely a result of twisting and crush injury he suffered during the workplace accident. I would recommend continued physical therapy to continue with weightbearing as tolerated, core strengthening and gait training. Patient will continue taking Tylenol as needed for pain. \par \par He does occasional significant pain & catching in the knee and to a lesser extent the hip. He is improving but still having pain. As he is unable to return to normal activity, he may need an MRI of the knee & he may need to be evaluated with our sports medicine team for further discussion of the hip injury. As he has been unable to return to work secondary to his continued pain, I would still consider him temporarily 100% disabled.\par \par Isai Morrow MD\par Orthopaedic Trauma Surgeon\par [facility]\par [facility] [place]

## 2019-12-02 NOTE — REASON FOR VISIT
[Worker's Compensation] : this visit is related to worker's compensation [Follow-Up Visit] : a follow-up visit for [FreeTextEntry2] : Left inferior pubic ramus fx and R sacral ala fracture.\par  \par

## 2019-12-02 NOTE — PHYSICAL EXAM
[de-identified] : Physical Exam:\par General: Well appearing, no acute distress, A&O\par Neurologic: A&Ox3, No focal deficits\par Head: NCAT without abrasions, lacerations, or ecchymosis to head, face, or scalp\par Eyes: No scleral icterus, no gross abnormalities\par Respiratory: Equal chest wall expansion bilaterally, no accessory muscle use\par Lymphatic: No lymphadenopathy palpated\par Skin: Warm and dry\par Psychiatric: Normal mood and affect\par Lower Extremities:\par RIGHT LE: No deformities, abrasions, or other signs of trauma at hip, thigh, knee, leg, ankle or foot. Full ROM pain at hip with IR, knee ROM 0-130 degrees TTP to patella tendon and medial joint line + yina, ankle and toe with negative log-roll and Stinchfield tests. + TTP lesser trochanter\par \par RLE strength: 		Hip flexion		5/5\par 			Quads			5/5\par 			Hamstrings		5/5\par 			Tib Anterior		5/5\par 			Gastroc		5/5\par 			EHL			5/5\par 			FHL			5/5\par RLE sensation intact to sural/saphenous/sup peroneal/deep peroneal/post tib distributions\par 2+ DP/PT pulses with good cap refill distally\par \par LEFT LE: No deformities, abrasions, or other signs of trauma at hip, thigh, knee, leg, ankle or foot. Full ROM with pain with hip flexion, knee, ankle and toe with negative log-roll and Stinchfield tests.\par \par LLE strength: 		Hip flexion		5/5\par 			Quads			5/5\par 			Hamstrings		5/5\par 			Tib Anterior		5/5\par 			Gastroc		5/5\par 			EHL			5/5\par 			FHL			5/5\par LLE sensation intact to sural/saphenous/sup peroneal/deep peroneal/post tib distributions\par 2+ DP/PT pulses with good cap refill distally\par Pelvis: Mild tenderness elicited with AP stress or rotational stress of pelvis, TTP at pubic symphysis. \par \par  [de-identified] : No new imaging today

## 2020-04-14 NOTE — PATIENT PROFILE ADULT - NSPROEXTENSIONSOFSELF_GEN_A_NUR
[facility] Emergency Department  [address]  [facility] [address]  Phone:  [phone]  Fax:  [phone]                                    Jenifer Delong   MRN: [id number]    Department:  [facility] Emergency Department   Date of Visit:  4/14/2020           After Visit Summary Signature Page    I have received my discharge instructions, and my questions have been answered. I have discussed any challenges I see with this plan with the nurse or doctor.    ..........................................................................................................................................  Patient/Patient Representative Signature      ..........................................................................................................................................  Patient Representative Print Name and Relationship to Patient    ..................................................               ................................................  Date                                   Time    ..........................................................................................................................................  Reviewed by Signature/Title    ...................................................              ..............................................  Date                                               Time          22EPIC Rev 08/18       
none

## 2022-05-12 NOTE — PROCEDURE NOTE - PROCEDURE
Opioid / Opioid Plus Controlled Substance Agreement    This is an agreement between you and your provider about the safe and appropriate use of controlled substance/opioids prescribed by your care team. Controlled substances are medicines that can cause physical and mental dependence (abuse).    There are strict laws about having and using these medicines. We here at [facility] are committing to working with you in your efforts to get better. To support you in this work, we ll help you schedule regular office appointments for medicine refills. If we must cancel or change your appointment for any reason, we ll make sure you have enough medicine to last until your next appointment.     As a Provider, I will:    Listen carefully to your concerns and treat you with respect.     Recommend a treatment plan that I believe is in your best interest. This plan may involve therapies other than opioid pain medication.     Talk with you often about the possible benefits, and the risk of harm of any medicine that we prescribe for you.     Provide a plan on how to taper (discontinue or go off) using this medicine if the decision is made to stop its use.    As a Patient, I understand that opioid(s):     Are a controlled substance prescribed by my care team to help me function or work and manage my condition(s).     Are strong medicines and can cause serious side effects such as:    Drowsiness, which can seriously affect my driving ability    A lower breathing rate, enough to cause death    Harm to my thinking ability     Depression     Abuse of and addiction to this medicine    Need to be taken exactly as prescribed. Combining opioids with certain medicines or chemicals (such as illegal drugs, sedatives, sleeping pills, and benzodiazepines) can be dangerous or even fatal. If I stop opioids suddenly, I may have severe withdrawal symptoms.    Do not work for all types of pain nor for all patients. If they re not helpful, I may  be asked to stop them.    The risks, benefits and side effects of these medicine(s) were explained to me. I agree that:  1. I will take part in other treatments as advised by my care team. This may be psychiatry or counseling, physical therapy, behavioral therapy, group treatment or a referral to a specialist.     2. I will keep all my appointments. I understand that this is part of the monitoring of opioids. My care team may require an office visit for EVERY opioid/controlled substance refill. If I miss appointments or don t follow instructions, my care team may stop my medicine.    3. I will take my medicines as prescribed. I will not change the dose or schedule unless my care team tells me to. There will be no refills if I run out early.     4. I may be asked to come to the clinic and complete a urine drug test or complete a pill count at any time. If I don t give a urine sample or participate in a pill count, the care team may stop my medicine.    5. I will only receive prescriptions from this clinic for chronic pain. If I am treated by another provider for acute pain issues, I will tell them that I am taking opioid pain medication for chronic pain and that I have a treatment agreement with this provider. I will inform my [facility] care team within one business day if I am given a prescription for any pain medication by another healthcare provider. My [facility] care team can contact other providers and pharmacists about my use of any medicines.    6. It is up to me to make sure that I don t run out of my medicines on weekends or holidays. If my care team is willing to refill my opioid prescription without a visit, I must request refills only during office hours. Refills may take up to 3 business days to process. I will use one pharmacy to fill all my opioid and other controlled substance prescriptions. I will notify the clinic about any changes to my insurance or medication availability.    7. I  am responsible for my prescriptions. If the medicine/prescription is lost, stolen or destroyed, it will not be replaced. I also agree not to share controlled substance medicines with anyone.    8. I am aware I should not use any illegal or recreational drugs. I agree not to drink alcohol unless my care team says I can.       9. If I enroll in the [place] [facility] program, I will tell my care team prior to my next refill.     10. I will tell my care team right away if I become pregnant, have a new medical problem treated outside of my regular clinic, or have a change in my medications.    11. I understand that this medicine can affect my thinking, judgment and reaction time. Alcohol and drugs affect the brain and body, which can affect the safety of my driving. Being under the influence of alcohol or drugs can affect my decision-making, behaviors, personal safety, and the safety of others. Driving while impaired (DWI) can occur if a person is driving, operating, or in physical control of a car, motorcycle, boat, snowmobile, ATV, motorbike, off-road vehicle, or any other motor vehicle ([place] Statute 169A.20). I understand the risk if I choose to drive or operate any vehicle or machinery.    I understand that if I do not follow any of the conditions above, my prescriptions or treatment may be stopped or changed.          Opioids  What You Need to Know    What are opioids?   Opioids are pain medicines that must be prescribed by a doctor. They are also known as narcotics.     Examples are:   1. morphine (MS Contin, Patti)  2. oxycodone (Oxycontin)  3. oxycodone and acetaminophen (Percocet)  4. hydrocodone and acetaminophen (Vicodin, Norco)   5. fentanyl patch (Duragesic)   6. hydromorphone (Dilaudid)   7. methadone  8. codeine (Tylenol #3)     What do opioids do well?   Opioids are best for severe short-term pain such as after a surgery or injury. They may work well for cancer pain. They may help some people with  long-lasting (chronic) pain.     What do opioids NOT do well?   Opioids never get rid of pain entirely, and they don t work well for most patients with chronic pain. Opioids don t reduce swelling, one of the causes of pain.                                    Other ways to manage chronic pain and improve function include:       Treat the health problem that may be causing pain    Anti-inflammation medicines, which reduce swelling and tenderness, such as ibuprofen (Advil, Motrin) or naproxen (Aleve)    Acetaminophen (Tylenol)    Antidepressants and anti-seizure medicines, especially for nerve pain    Topical treatments such as patches or creams    Injections or nerve blocks    Chiropractic or osteopathic treatment    Acupuncture, massage, deep breathing, meditation, visual imagery, aromatherapy    Use heat or ice at the pain site    Physical therapy     Exercise    Stop smoking    Take part in therapy       Risks and side effects     Talk to your doctor before you start or decide to keep taking opioids. Possible side effects include:      Lowering your breathing rate enough to cause death    Overdose, including death, especially if taking higher than prescribed doses    Worse depression symptoms; less pleasure in things you usually enjoy    Feeling tired or sluggish    Slower thoughts or cloudy thinking    Being more sensitive to pain over time; pain is harder to control    Trouble sleeping or restless sleep    Changes in hormone levels (for example, less testosterone)    Changes in sex drive or ability to have sex    Constipation    Unsafe driving    Itching and sweating    Dizziness    Nausea, throwing up and dry mouth    What else should I know about opioids?    Opioids may lead to dependence, tolerance, or addiction.      Dependence means that if you stop or reduce the medicine too quickly, you will have withdrawal symptoms. These include loose poop (diarrhea), jitters, flu-like symptoms, nervousness and tremors.  Dependence is not the same as addiction.                       Tolerance means needing higher doses over time to get the same effect. This may increase the chance of serious side effects.      Addiction is when people improperly use a substance that harms their body, their mind or their relations with others. Use of opiates can cause a relapse of addiction if you have a history of drug or alcohol abuse.      People who have used opioids for a long time may have a lower quality of life, worse depression, higher levels of pain and more visits to doctors.    You can overdose on opioids. Take these steps to lower your risk of overdose:    1. Recognize the signs:  Signs of overdose include decrease or loss of consciousness (blackout), slowed breathing, trouble waking up and blue lips. If someone is worried about overdose, they should call 911.    2. Talk to your doctor about Narcan (naloxone).   If you are at risk for overdose, you may be given a prescription for Narcan. This medicine very quickly reverses the effects of opioids.   If you overdose, a friend or family member can give you Narcan while waiting for the ambulance. They need to know the signs of overdose and how to give Narcan.     3. Don't use alcohol or street drugs.   Taking them with opioids can cause death.    4. Do not take any of these medicines unless your doctor says it s OK. Taking these with opioids can cause death:    Benzodiazepines, such as lorazepam (Ativan), alprazolam (Xanax) or diazepam (Valium)    Muscle relaxers, such as cyclobenzaprine (Flexeril)    Sleeping pills like zolpidem (Ambien)     Other opioids      How to keep you and other people safe while taking opioids:    1. Never share your opioids with others.  Opioid medicines are regulated by the Drug Enforcement Agency ([employer]). Selling or sharing medications is a criminal act.    2. Be sure to store opioids in a secure place, locked up if possible. Young children can easily swallow them  and overdose.    3. When you are traveling with your medicines, keep them in the original bottles. If you use a pill box, be sure you also carry a copy of your medicine list from your clinic or pharmacy.    4. Safe disposal of opioids    Most pharmacies have places to get rid of medicine, called disposal kiosks. Medicine disposal options are also available in every [facility]. Search your county and  medication disposal  to find more options. You can find more details at:  https://www.pca.[email or web address]./living-green/managing-unwanted-medications     I agree that my provider, clinic care team, and pharmacy may work with any city, state or federal law enforcement agency that investigates the misuse, sale, or other diversion of my controlled medicine. I will allow my provider to discuss my care with, or share a copy of, this agreement with any other treating provider, pharmacy or emergency room where I receive care.    I have read this agreement and have asked questions about anything I did not understand.    _______________________________________________________  Patient Signature - Renetta Lauren _____________________                   Date     _______________________________________________________  Provider Signature - Kendall Garza MD   _____________________                   Date     _______________________________________________________  Witness Signature (required if provider not present while patient signing)   _____________________                   Date      <<-----Click on this checkbox to enter Procedure Central venous catheter insertion  01/24/2019    Active  SUNDEEP
